# Patient Record
Sex: MALE | Race: BLACK OR AFRICAN AMERICAN
[De-identification: names, ages, dates, MRNs, and addresses within clinical notes are randomized per-mention and may not be internally consistent; named-entity substitution may affect disease eponyms.]

---

## 2020-05-01 ENCOUNTER — HOSPITAL ENCOUNTER (OUTPATIENT)
Dept: HOSPITAL 53 - M LAB | Age: 25
End: 2020-05-01
Attending: NURSE PRACTITIONER

## 2020-05-01 DIAGNOSIS — Z00.00: Primary | ICD-10-CM

## 2020-05-02 LAB
MEV IGG SER IA-ACNC: >300 AU/ML
VZV IGG SER IA-ACNC: <135 INDEX

## 2022-10-22 ENCOUNTER — APPOINTMENT (OUTPATIENT)
Dept: GENERAL RADIOLOGY | Age: 27
DRG: 121 | End: 2022-10-22
Attending: EMERGENCY MEDICINE
Payer: MEDICAID

## 2022-10-22 ENCOUNTER — HOSPITAL ENCOUNTER (INPATIENT)
Age: 27
LOS: 6 days | Discharge: HOME | DRG: 121 | End: 2022-10-28
Attending: EMERGENCY MEDICINE | Admitting: INTERNAL MEDICINE
Payer: MEDICAID

## 2022-10-22 DIAGNOSIS — J93.11 PRIMARY SPONTANEOUS PNEUMOTHORAX: Primary | ICD-10-CM

## 2022-10-22 DIAGNOSIS — Z98.890 S/P THORACOTOMY: ICD-10-CM

## 2022-10-22 PROBLEM — R07.9 CHEST PAIN: Status: ACTIVE | Noted: 2022-10-22

## 2022-10-22 PROBLEM — J93.9 PNEUMOTHORAX: Status: ACTIVE | Noted: 2022-10-22

## 2022-10-22 LAB
ALBUMIN SERPL-MCNC: 4.1 G/DL (ref 3.4–5)
ALBUMIN/GLOB SERPL: 1.3 {RATIO} (ref 0.8–1.7)
ALP SERPL-CCNC: 60 U/L (ref 45–117)
ALT SERPL-CCNC: 25 U/L (ref 16–61)
AMPHET UR QL SCN: NEGATIVE
ANION GAP SERPL CALC-SCNC: 6 MMOL/L (ref 3–18)
AST SERPL-CCNC: 13 U/L (ref 10–38)
ATRIAL RATE: 100 BPM
BARBITURATES UR QL SCN: NEGATIVE
BASOPHILS # BLD: 0 K/UL (ref 0–0.1)
BASOPHILS NFR BLD: 0 % (ref 0–2)
BENZODIAZ UR QL: NEGATIVE
BILIRUB SERPL-MCNC: 0.5 MG/DL (ref 0.2–1)
BUN SERPL-MCNC: 11 MG/DL (ref 7–18)
BUN/CREAT SERPL: 11 (ref 12–20)
CALCIUM SERPL-MCNC: 9.8 MG/DL (ref 8.5–10.1)
CALCULATED P AXIS, ECG09: 81 DEGREES
CALCULATED R AXIS, ECG10: 101 DEGREES
CALCULATED T AXIS, ECG11: 71 DEGREES
CANNABINOIDS UR QL SCN: POSITIVE
CHLORIDE SERPL-SCNC: 108 MMOL/L (ref 100–111)
CO2 SERPL-SCNC: 26 MMOL/L (ref 21–32)
COCAINE UR QL SCN: NEGATIVE
CREAT SERPL-MCNC: 1.01 MG/DL (ref 0.6–1.3)
DIAGNOSIS, 93000: NORMAL
DIFFERENTIAL METHOD BLD: ABNORMAL
EOSINOPHIL # BLD: 0.1 K/UL (ref 0–0.4)
EOSINOPHIL NFR BLD: 2 % (ref 0–5)
ERYTHROCYTE [DISTWIDTH] IN BLOOD BY AUTOMATED COUNT: 17.2 % (ref 11.6–14.5)
GLOBULIN SER CALC-MCNC: 3.2 G/DL (ref 2–4)
GLUCOSE SERPL-MCNC: 98 MG/DL (ref 74–99)
HCT VFR BLD AUTO: 46.7 % (ref 36–48)
HDSCOM,HDSCOM: ABNORMAL
HGB BLD-MCNC: 15 G/DL (ref 13–16)
IMM GRANULOCYTES # BLD AUTO: 0 K/UL (ref 0–0.04)
IMM GRANULOCYTES NFR BLD AUTO: 0 % (ref 0–0.5)
LYMPHOCYTES # BLD: 2.2 K/UL (ref 0.9–3.6)
LYMPHOCYTES NFR BLD: 29 % (ref 21–52)
MCH RBC QN AUTO: 23.3 PG (ref 24–34)
MCHC RBC AUTO-ENTMCNC: 32.1 G/DL (ref 31–37)
MCV RBC AUTO: 72.6 FL (ref 78–100)
METHADONE UR QL: NEGATIVE
MONOCYTES # BLD: 0.6 K/UL (ref 0.05–1.2)
MONOCYTES NFR BLD: 8 % (ref 3–10)
NEUTS SEG # BLD: 4.8 K/UL (ref 1.8–8)
NEUTS SEG NFR BLD: 62 % (ref 40–73)
NRBC # BLD: 0 K/UL (ref 0–0.01)
NRBC BLD-RTO: 0 PER 100 WBC
OPIATES UR QL: NEGATIVE
P-R INTERVAL, ECG05: 120 MS
PCP UR QL: NEGATIVE
PLATELET # BLD AUTO: 300 K/UL (ref 135–420)
PMV BLD AUTO: 10 FL (ref 9.2–11.8)
POTASSIUM SERPL-SCNC: 4.1 MMOL/L (ref 3.5–5.5)
PROT SERPL-MCNC: 7.3 G/DL (ref 6.4–8.2)
Q-T INTERVAL, ECG07: 336 MS
QRS DURATION, ECG06: 82 MS
QTC CALCULATION (BEZET), ECG08: 433 MS
RBC # BLD AUTO: 6.43 M/UL (ref 4.35–5.65)
SODIUM SERPL-SCNC: 140 MMOL/L (ref 136–145)
TROPONIN-HIGH SENSITIVITY: 5 NG/L (ref 0–78)
VENTRICULAR RATE, ECG03: 100 BPM
WBC # BLD AUTO: 7.7 K/UL (ref 4.6–13.2)

## 2022-10-22 PROCEDURE — 74011000250 HC RX REV CODE- 250: Performed by: STUDENT IN AN ORGANIZED HEALTH CARE EDUCATION/TRAINING PROGRAM

## 2022-10-22 PROCEDURE — 96374 THER/PROPH/DIAG INJ IV PUSH: CPT

## 2022-10-22 PROCEDURE — 84484 ASSAY OF TROPONIN QUANT: CPT

## 2022-10-22 PROCEDURE — 80307 DRUG TEST PRSMV CHEM ANLYZR: CPT

## 2022-10-22 PROCEDURE — 93005 ELECTROCARDIOGRAM TRACING: CPT

## 2022-10-22 PROCEDURE — 80053 COMPREHEN METABOLIC PANEL: CPT

## 2022-10-22 PROCEDURE — 74011000250 HC RX REV CODE- 250: Performed by: INTERNAL MEDICINE

## 2022-10-22 PROCEDURE — 96375 TX/PRO/DX INJ NEW DRUG ADDON: CPT

## 2022-10-22 PROCEDURE — 75810000165 HC THORACENTESIS

## 2022-10-22 PROCEDURE — 71045 X-RAY EXAM CHEST 1 VIEW: CPT

## 2022-10-22 PROCEDURE — 85025 COMPLETE CBC W/AUTO DIFF WBC: CPT

## 2022-10-22 PROCEDURE — 74011250637 HC RX REV CODE- 250/637: Performed by: INTERNAL MEDICINE

## 2022-10-22 PROCEDURE — 99223 1ST HOSP IP/OBS HIGH 75: CPT | Performed by: INTERNAL MEDICINE

## 2022-10-22 PROCEDURE — 99252 IP/OBS CONSLTJ NEW/EST SF 35: CPT | Performed by: INTERNAL MEDICINE

## 2022-10-22 PROCEDURE — 74011250636 HC RX REV CODE- 250/636

## 2022-10-22 PROCEDURE — 65660000004 HC RM CVT STEPDOWN

## 2022-10-22 PROCEDURE — 74011250636 HC RX REV CODE- 250/636: Performed by: INTERNAL MEDICINE

## 2022-10-22 PROCEDURE — 74011250636 HC RX REV CODE- 250/636: Performed by: STUDENT IN AN ORGANIZED HEALTH CARE EDUCATION/TRAINING PROGRAM

## 2022-10-22 PROCEDURE — 99285 EMERGENCY DEPT VISIT HI MDM: CPT

## 2022-10-22 PROCEDURE — 2709999900 HC NON-CHARGEABLE SUPPLY

## 2022-10-22 RX ORDER — FAMOTIDINE 20 MG/1
20 TABLET, FILM COATED ORAL EVERY EVENING
Status: DISCONTINUED | OUTPATIENT
Start: 2022-10-22 | End: 2022-10-28 | Stop reason: HOSPADM

## 2022-10-22 RX ORDER — OXYCODONE HYDROCHLORIDE 5 MG/1
5-10 TABLET ORAL
Status: DISCONTINUED | OUTPATIENT
Start: 2022-10-22 | End: 2022-10-26 | Stop reason: SDUPTHER

## 2022-10-22 RX ORDER — POLYETHYLENE GLYCOL 3350 17 G/17G
17 POWDER, FOR SOLUTION ORAL DAILY PRN
Status: DISCONTINUED | OUTPATIENT
Start: 2022-10-22 | End: 2022-10-28 | Stop reason: HOSPADM

## 2022-10-22 RX ORDER — SODIUM CHLORIDE 0.9 % (FLUSH) 0.9 %
5-40 SYRINGE (ML) INJECTION EVERY 8 HOURS
Status: DISCONTINUED | OUTPATIENT
Start: 2022-10-22 | End: 2022-10-28 | Stop reason: HOSPADM

## 2022-10-22 RX ORDER — ONDANSETRON 2 MG/ML
4 INJECTION INTRAMUSCULAR; INTRAVENOUS
Status: DISCONTINUED | OUTPATIENT
Start: 2022-10-22 | End: 2022-10-26 | Stop reason: SDUPTHER

## 2022-10-22 RX ORDER — ACETAMINOPHEN 500 MG
1000 TABLET ORAL EVERY 8 HOURS
Status: DISCONTINUED | OUTPATIENT
Start: 2022-10-22 | End: 2022-10-26

## 2022-10-22 RX ORDER — KETAMINE HCL 50MG/ML(1)
0.25 SYRINGE (ML) INTRAVENOUS ONCE
Status: COMPLETED | OUTPATIENT
Start: 2022-10-22 | End: 2022-10-22

## 2022-10-22 RX ORDER — IPRATROPIUM BROMIDE AND ALBUTEROL SULFATE 2.5; .5 MG/3ML; MG/3ML
3 SOLUTION RESPIRATORY (INHALATION)
Status: DISCONTINUED | OUTPATIENT
Start: 2022-10-22 | End: 2022-10-28 | Stop reason: HOSPADM

## 2022-10-22 RX ORDER — ACETAMINOPHEN 325 MG/1
650 TABLET ORAL
Status: DISCONTINUED | OUTPATIENT
Start: 2022-10-22 | End: 2022-10-26 | Stop reason: SDUPTHER

## 2022-10-22 RX ORDER — MORPHINE SULFATE 4 MG/ML
4 INJECTION INTRAVENOUS
Status: COMPLETED | OUTPATIENT
Start: 2022-10-22 | End: 2022-10-22

## 2022-10-22 RX ORDER — ONDANSETRON 2 MG/ML
4 INJECTION INTRAMUSCULAR; INTRAVENOUS ONCE
Status: COMPLETED | OUTPATIENT
Start: 2022-10-22 | End: 2022-10-22

## 2022-10-22 RX ORDER — HYDROMORPHONE HYDROCHLORIDE 1 MG/ML
1 INJECTION, SOLUTION INTRAMUSCULAR; INTRAVENOUS; SUBCUTANEOUS ONCE
Status: COMPLETED | OUTPATIENT
Start: 2022-10-22 | End: 2022-10-22

## 2022-10-22 RX ORDER — PROMETHAZINE HYDROCHLORIDE 12.5 MG/1
12.5 TABLET ORAL
Status: DISCONTINUED | OUTPATIENT
Start: 2022-10-22 | End: 2022-10-28 | Stop reason: HOSPADM

## 2022-10-22 RX ORDER — MORPHINE SULFATE 4 MG/ML
4 INJECTION INTRAVENOUS
Status: DISCONTINUED | OUTPATIENT
Start: 2022-10-22 | End: 2022-10-26

## 2022-10-22 RX ORDER — SODIUM CHLORIDE 0.9 % (FLUSH) 0.9 %
5-40 SYRINGE (ML) INJECTION AS NEEDED
Status: DISCONTINUED | OUTPATIENT
Start: 2022-10-22 | End: 2022-10-28 | Stop reason: HOSPADM

## 2022-10-22 RX ORDER — LIDOCAINE HYDROCHLORIDE 10 MG/ML
5 INJECTION, SOLUTION EPIDURAL; INFILTRATION; INTRACAUDAL; PERINEURAL ONCE
Status: COMPLETED | OUTPATIENT
Start: 2022-10-22 | End: 2022-10-22

## 2022-10-22 RX ORDER — HYDROMORPHONE HYDROCHLORIDE 1 MG/ML
1 INJECTION, SOLUTION INTRAMUSCULAR; INTRAVENOUS; SUBCUTANEOUS ONCE
Status: DISCONTINUED | OUTPATIENT
Start: 2022-10-22 | End: 2022-10-22

## 2022-10-22 RX ORDER — MORPHINE SULFATE 4 MG/ML
INJECTION, SOLUTION INTRAMUSCULAR; INTRAVENOUS
Status: COMPLETED
Start: 2022-10-22 | End: 2022-10-22

## 2022-10-22 RX ADMIN — MORPHINE SULFATE: 4 INJECTION, SOLUTION INTRAMUSCULAR; INTRAVENOUS at 10:06

## 2022-10-22 RX ADMIN — MORPHINE SULFATE 4 MG: 4 INJECTION INTRAVENOUS at 15:37

## 2022-10-22 RX ADMIN — Medication 19.5 MG: at 09:41

## 2022-10-22 RX ADMIN — MORPHINE SULFATE: 4 INJECTION INTRAVENOUS at 10:06

## 2022-10-22 RX ADMIN — Medication 0.4 MG: at 09:45

## 2022-10-22 RX ADMIN — MORPHINE SULFATE 4 MG: 4 INJECTION INTRAVENOUS at 20:40

## 2022-10-22 RX ADMIN — ACETAMINOPHEN 1000 MG: 500 TABLET ORAL at 22:10

## 2022-10-22 RX ADMIN — FAMOTIDINE 20 MG: 20 TABLET ORAL at 17:34

## 2022-10-22 RX ADMIN — Medication 0.4 MG: at 09:47

## 2022-10-22 RX ADMIN — SODIUM CHLORIDE, PRESERVATIVE FREE 10 ML: 5 INJECTION INTRAVENOUS at 15:02

## 2022-10-22 RX ADMIN — LIDOCAINE HYDROCHLORIDE 5 ML: 10 INJECTION, SOLUTION EPIDURAL; INFILTRATION; INTRACAUDAL; PERINEURAL at 09:44

## 2022-10-22 RX ADMIN — SODIUM CHLORIDE, PRESERVATIVE FREE 10 ML: 5 INJECTION INTRAVENOUS at 20:40

## 2022-10-22 RX ADMIN — SODIUM CHLORIDE, PRESERVATIVE FREE 10 ML: 5 INJECTION INTRAVENOUS at 22:10

## 2022-10-22 RX ADMIN — OXYCODONE HYDROCHLORIDE 5 MG: 5 TABLET ORAL at 13:00

## 2022-10-22 RX ADMIN — Medication 0.2 MG: at 09:53

## 2022-10-22 RX ADMIN — ONDANSETRON 4 MG: 2 INJECTION INTRAMUSCULAR; INTRAVENOUS at 09:23

## 2022-10-22 RX ADMIN — HYDROMORPHONE HYDROCHLORIDE 1 MG: 1 INJECTION, SOLUTION INTRAMUSCULAR; INTRAVENOUS; SUBCUTANEOUS at 10:25

## 2022-10-22 RX ADMIN — ACETAMINOPHEN 1000 MG: 500 TABLET ORAL at 15:02

## 2022-10-22 NOTE — H&P
History & Physical    Patient: Rick Montero MRN: [id number]  [facility]: [id number]    YOB: 1995  Age: 33 y.o. Sex: male      DOA: 10/22/2022  CC: left-sided chest pain    PCP: None       HPI:     Rick Montero is a 33 y.o. male with medical co-morbidities including active tobacco smoking, vaping, marijuana usage, presented from home with left-sided chest pain and hard to breath. He experienced persistent left-sided chest pain on waking up this AM. He had shortness of breath. No cough, no fever. He was doing well after work since last night. No leg swelling, no recent long distance travel. In the ER, he was found to have left tension pneumothorax. Lab was normal. Negative troponin. He received emergent chest tube placement. On repeat CXR, with improved pneumothorax. Pulmonary was consulted from the ER for further follow up. He has no family history of pneumothorax. No history of trauma to chest wall. He smokes tobacco and marijuana. No he has chest pain at the insertion site. Review of Systems  GENERAL: No fever, No chill, No malaise   HEENT: No change in vision, no ear ache, no sore throat or sinus congestion. NECK: No pain or stiffness. PULMONARY: + shortness of breath, no cough or wheeze. Cardiovascular: no pnd / orthopnea, + left-sided Chest Pain  GASTROINTESTINAL: No abd pain, No nausea/vomiting, No diarrhea, No melena or bright red blood per rectum. GENITOURINARY: No urinary frequency, No urgency or pain with urination. MUSCULOSKELETAL: No joint or muscle pain, no back pain, no recent trauma. DERMATOLOGIC: No rash, no itching, no lesions. ENDOCRINE: No polyuria, polydipsia, No recent change in weight. HEMATOLOGICAL: No easy bruising or bleeding. NEUROLOGIC: No headache, No seizures, No generalized weakness         History reviewed. No pertinent past medical history. He denied    History reviewed. No pertinent surgical history.   He denied     Family History Problem Relation Age of Onset    Cancer Father        Social History     Socioeconomic History    Marital status: SINGLE   Tobacco Use    Smoking status: Every Day     Types: Cigarettes    Smokeless tobacco: Never   Vaping Use    Vaping Use: Every day   Substance and Sexual Activity    Alcohol use: Never    Drug use: Yes     Types: Marijuana       Prior to Admission medications    Not on File   No medication currently    No Known Allergies           Physical Exam:      Visit Vitals  BP (!) 149/69   Pulse 86   Temp 97.6 °F (36.4 °C)   Resp 21   Ht 5' 11\" (1.803 m)   Wt 77.1 kg (170 lb)   SpO2 100%   BMI 23.71 kg/m²       Physical Exam:  Tele: sinus   General:  Cooperative, Not in acute distress, speaks in short sentence while in bed  HEENT: PERRL, EOMI, supple neck, no JVD, dry oral mucosa  Cardiovascular: S1S2 regular, no rub/gallop   Pulmonary: air entry bilaterally, no wheezing, no crackle  Chest tube on left lateral chest, no crepitus in the surrounding chest wall  GI:  Soft, non tender, non distended, +bs, no guarding   Extremities:  No pedal edema, +distal pulses appreciated   Neuro: AOx3, moving all extremities, no gross deficit. Lab/Data Review:  Labs: Results:       Chemistry Recent Labs     10/22/22  0859   GLU 98      K 4.1      CO2 26   BUN 11   CREA 1.01   CA 9.8   AGAP 6   BUCR 11*   AP 60   TP 7.3   ALB 4.1   GLOB 3.2   AGRAT 1.3      CBC w/Diff Recent Labs     10/22/22  0859   WBC 7.7   RBC 6.43*   HGB 15.0   HCT 46.7      GRANS 62   LYMPH 29   EOS 2      Coagulation No results for input(s): PTP, INR, APTT, INREXT in the last 72 hours. Iron/Ferritin No results for input(s): IRON in the last 72 hours. No lab exists for component: TIBCCALC   BNP No results for input(s): BNPP in the last 72 hours. Cardiac Enzymes No results for input(s): CPK, CKND1, RAMBO in the last 72 hours.     No lab exists for component: CKRMB, TROIP   Liver Enzymes Recent Labs     10/22/22  0859   TP 7. 3   ALB 4.1   AP 60      Thyroid Studies No results found for: T4, T3U, TSH, TSHEXT       All Micro Results       None            Imaging Reviewed:  XR Results (most recent):  Results from Hospital Encounter encounter on 10/22/22    XR CHEST PORT    Narrative  CHEST PORTABLE 1014 hours    COMPARISON: 0856 hours. INDICATIONS: Pneumothorax. FINDINGS:    Portable single view chest demonstrates:    Lungs: Clear. Cardiac Silhouette And Mediastinal Contours: Normal.    Pleural Spaces: A chest tube is been placed. Is no significant residual volume  of pneumothorax. .    Bones And Soft Tissues: Unremarkable for age. Impression  A chest tube is been placed with aspiration of the pneumothorax. .      CHEST PORTABLE 0856 hours  COMPARISON: None. INDICATIONS: Chest pain. FINDINGS:   Portable single view chest demonstrates:  Lungs: The left lung is completely atelectatic. Cardiac Silhouette And Mediastinal Contours: There is mild dextroversion of the  cardiac and mediastinal structures. Pleural Spaces: A left tension pneumothorax is present. Bones And Soft Tissues: Unremarkable for age. IMPRESSION  Left tension pneumothorax. An immediate reading was provided to Dr. Bharath Moctezuma  in the ED at 921 hours    Assessment:   Active Problems:    Spontaneous Tension Pneumothorax (10/22/2022)    Left-sided Chest pain due to pneumothorax     Tobacco smoking     Marijuana smoking     Hypertension due to pain at the time     Plan:     Admitted to stepdown for close monitoring. Add duoneb prn.    Continue chest tube to wall suction for now   Daily chest xray   Pulmonary consult follow up  Educated on risks associated with tobacco smoking, advised on tobacco smoking cessation   Can have tylenol 1000mg Q8hrs, add prn morphine or oxycodone   Pepcid   ICS encourage   Chest urine tox screen         Risk of deterioration:  []Low    [x]Moderate  []High     Prophylaxis:  []Lovenox  []Coumadin  []Hep SQ  [x]SCDs  [x]H2B/PPI Disposition:  [x]Home w/ Family   []HH PT,OT,RN   []SNF/LTC   []SAH/Rehab     Discussed Code Status:         [x]Full Code      []DNR         ___________________________________________________     Care Plan discussed with:    [x]Patient   []Family    []ED Care Manager  [x]ED Doc   [x]Specialist :  Total Time Coordinating Admission:  >55  minutes    []Total Critical Care Time:       Lori Garay MD  10/22/2022, 11:50 AM

## 2022-10-22 NOTE — ROUTINE PROCESS
TRANSFER - OUT REPORT:    Verbal report given to Kiersten(name) on Tegan Bliss  being transferred to 2306(unit) for routine progression of care       Report consisted of patients Situation, Background, Assessment and   Recommendations(SBAR). Information from the following report(s) SBAR was reviewed with the receiving nurse. Lines:   Peripheral IV 10/22/22 Right Antecubital (Active)   Site Assessment Clean, dry, & intact 10/22/22 0859   Phlebitis Assessment 0 10/22/22 0859   Infiltration Assessment 0 10/22/22 0859   Dressing Status Clean, dry, & intact 10/22/22 0859   Hub Color/Line Status Green 10/22/22 0859        Opportunity for questions and clarification was provided.       Patient transported with:   Registered Nurse, monitor, transport

## 2022-10-22 NOTE — CONSULTS
[facility]  Pulmonary, Critical Care, and Sleep Medicine    Name: Elina Callaway MRN: [id number]   : 1995 Hospital: [address] [place] [address] Dr   Date: 10/22/2022        Pulmonary Initial In-Patient Consult                                              Consult requesting physician: Dr. Carlyon Schwab  Reason for Consult: Spontaneous pneumothorax    IMPRESSION:   Spontaneous pneumothorax on left side now resolved s/p chest tube placement. Tobacco abuse  Microcytic anemia: thalassemia versus iron deficiency anemia        RECOMMENDATIONS:   Recommend keeping chest tube in place on suction. Titrate O2 to maintain sats > 92%. Recommend non-contrast chest CT to evaluate lung parenchyma. Recommend work up of microcytic anemia - consider iron studies, hemoglobin electrophoresis  Optimize pain management  Nutrition: per primary team  Replace electrolytes  HOB >=30 degree, aggressive pulmonary toileting, incentive spirometry, PT/OT eval and treat  GI Prophylaxis: per primary team  not indicated  DVT Prophylaxis: SCDs recommended   Smoking cessation counseling done by me and spent >11 minutes on it. Further recommendations will be based on the patient's response to recommended treatment and results of the investigation ordered. Subjective/History:     Elina Callaway is a 33 y.o. male with PMHx significant for tobacco use who had sudden onset this morning of left sided chest pain and dyspnea when we awoke from sleep. No previous ill contacts, other symptoms or trauma. Upon presentation to ED, left sided pneumothorax diagnosed and chest tube placed on left side without complication and noted to have full lung re-expansion. Currently has moderate left sided pain and has no dyspnea on 4 L/min of supplemental O2. Review of Systems:  A comprehensive review of systems was negative except for that written in the HPI. No Known Allergies     History reviewed. No pertinent past medical history. History reviewed. No pertinent surgical history. Family History   Problem Relation Age of Onset    Cancer Father         Social History     Tobacco Use    Smoking status: Every Day     Types: Cigarettes    Smokeless tobacco: Never   Substance Use Topics    Alcohol use: Never      Prior to Admission medications    Not on File       Current Facility-Administered Medications   Medication Dose Route Frequency    sodium chloride (NS) flush 5-40 mL  5-40 mL IntraVENous Q8H    sodium chloride (NS) flush 5-40 mL  5-40 mL IntraVENous PRN    acetaminophen (TYLENOL) tablet 650 mg  650 mg Oral Q6H PRN    Or    acetaminophen (TYLENOL) suppository 650 mg  650 mg Rectal Q6H PRN    polyethylene glycol (MIRALAX) packet 17 g  17 g Oral DAILY PRN    promethazine (PHENERGAN) tablet 12.5 mg  12.5 mg Oral Q6H PRN    Or    ondansetron (ZOFRAN) injection 4 mg  4 mg IntraVENous Q6H PRN    famotidine (PEPCID) tablet 20 mg  20 mg Oral QPM    albuterol-ipratropium (DUO-NEB) 2.5 MG-0.5 MG/3 ML  3 mL Nebulization Q6H PRN    acetaminophen (TYLENOL) tablet 1,000 mg  1,000 mg Oral Q8H    oxyCODONE IR (ROXICODONE) tablet 5-10 mg  5-10 mg Oral Q4H PRN    morphine injection 4 mg  4 mg IntraVENous Q4H PRN     No current outpatient medications on file. Objective:   Vital Signs:    Visit Vitals  BP (!) 149/69   Pulse (!) 58   Temp 97.6 °F (36.4 °C)   Resp 21   Ht 5' 11\" (1.803 m)   Wt 77.1 kg (170 lb)   SpO2 100%   BMI 23.71 kg/m²       O2 Device: Nasal cannula   O2 Flow Rate (L/min): 4 l/min   Temp (24hrs), Av.6 °F (36.4 °C), Min:97.6 °F (36.4 °C), Max:97.6 °F (36.4 °C)       Intake/Output:   Last shift:      No intake/output data recorded. Last 3 shifts: No intake/output data recorded. No intake or output data in the 24 hours ending 10/22/22 1247    Physical Exam:     General:  Alert, Awake, NAD, cooperative, no distress, appears stated age. Head:   Normocephalic, without obvious abnormality, atraumatic.   Eye: Conjunctivae/corneas clear. PERRLA, no scleral icterus, no pallor, no cyanosis  Nose:   Nares normal. Septum midline. Mucosa normal without erythema/exudate. No drainage/discharge. No sinus tenderness. Throat:  Lips, mucosa, and tongue normal. Teeth and gums normal. No tonsillar enlargement, no erythema, no exudates, no oral thrush  Neck:   Supple, symmetric, thyroid: no enlargement/tenderness/nodule, no JVD, no carotid bruit, no lymphadenopathy. Trachea midline  Back & spine: Symmetric, no curvature. Chest wall: Chest tube in place left thorax  Lung:   Adequate air entry bilateral equal, no rales, no rhonchi, no wheezing. No dullness on percussion. Heart:   Regular rate & rhythm. S1 S2 present, no murmur, no gallop, no click, no rub  Abdomen:  Soft, NT, ND, +BS, no masses, no organomegaly  Extremities:  No pedal edema, no cyanosis, no clubbing  Pulses: 2+ and symmetric in DP  Lymphatic:  No cervical, supraclavicular and axillary palpable lymphadenopathy. Musculoskeletal: No joint swelling or tenderness. Neurologic:  Grossly non focal.        Data:         Chemistry Recent Labs     10/22/22  0859   GLU 98      K 4.1      CO2 26   BUN 11   CREA 1.01   CA 9.8   AGAP 6   BUCR 11*   AP 60   TP 7.3   ALB 4.1   GLOB 3.2   AGRAT 1.3        Lactic Acid No results found for: LAC  No results for input(s): LAC in the last 72 hours. Liver Enzymes Protein, total   Date Value Ref Range Status   10/22/2022 7.3 6.4 - 8.2 g/dL Final     Albumin   Date Value Ref Range Status   10/22/2022 4.1 3.4 - 5.0 g/dL Final     Globulin   Date Value Ref Range Status   10/22/2022 3.2 2.0 - 4.0 g/dL Final     A-G Ratio   Date Value Ref Range Status   10/22/2022 1.3 0.8 - 1.7   Final     Alk.  phosphatase   Date Value Ref Range Status   10/22/2022 60 45 - 117 U/L Final     Recent Labs     10/22/22  0859   TP 7.3   ALB 4.1   GLOB 3.2   AGRAT 1.3   AP 60        CBC w/Diff Recent Labs     10/22/22  0859   WBC 7.7   RBC 6.43* HGB 15.0   HCT 46.7      GRANS 62   LYMPH 29   EOS 2        Cardiac Enzymes No results found for: CPK, CK, CKMMB, CKMB, RCK3, CKMBT, CKNDX, CKND1, RAMBO, TROPT, TROIQ, BINTA, TROPT, TNIPOC, BNP, BNPP     BNP No results found for: BNP, BNPP, XBNPT     Coagulation No results for input(s): PTP, INR, APTT, INREXT in the last 72 hours. Thyroid  No results found for: T4, T3U, TSH, TSHEXT       Lipid Panel No results found for: CHOL, CHOLPOCT, CHOLX, CHLST, CHOLV, 052730, HDL, HDLP, LDL, LDLC, DLDLP, 592873, VLDLC, VLDL, TGLX, TRIGL, TRIGP, TGLPOCT, CHHD, CHHDX     ABG No results for input(s): PHI, PHI, POC2, PCO2I, PO2, PO2I, HCO3, HCO3I, FIO2, FIO2I in the last 72 hours. Urinalysis No results found for: COLOR, APPRN, SPGRU, REFSG, SHAHNAZ, PROTU, GLUCU, KETU, BILU, UROU, JAMES, LEUKU, GLUKE, EPSU, BACTU, WBCU, RBCU, CASTS, UCRY     Micro  No results for input(s): SDES, CULT in the last 72 hours. No results for input(s): CULT in the last 72 hours. XR (Most Recent). CXR reviewed by me and compared with previous CXR Results from Hospital Encounter encounter on 10/22/22    XR CHEST PORT    Narrative  CHEST PORTABLE 1014 hours    COMPARISON: 0856 hours. INDICATIONS: Pneumothorax. FINDINGS:    Portable single view chest demonstrates:    Lungs: Clear. Cardiac Silhouette And Mediastinal Contours: Normal.    Pleural Spaces: A chest tube is been placed. Is no significant residual volume  of pneumothorax. .    Bones And Soft Tissues: Unremarkable for age. Impression  A chest tube is been placed with aspiration of the pneumothorax. .       CT (Most Recent) No results found for this or any previous visit. EKG No results found for this or any previous visit. ECHO No results found for this or any previous visit. PFT No flowsheet data found.      Other ASA reactivity:   Pre-albumin:   Ionized Calcium:   NH4:   T3, FT4:  Cortisol:  Urine Osm:  Urine Lytes:   HbA1c:      Recent Results (from the past 24 hour(s))   EKG, 12 LEAD, INITIAL    Collection Time: 10/22/22  8:50 AM   Result Value Ref Range    Ventricular Rate 100 BPM    Atrial Rate 100 BPM    P-R Interval 120 ms    QRS Duration 82 ms    Q-T Interval 336 ms    QTC Calculation (Bezet) 433 ms    Calculated P Axis 81 degrees    Calculated R Axis 101 degrees    Calculated T Axis 71 degrees    Diagnosis       Normal sinus rhythm  Right atrial enlargement  Anterolateral infarct , age undetermined  Abnormal ECG  No previous ECGs available  Confirmed by Garrett Morin MD, Tiana Leon ([id number]) on 10/22/2022 12:00:40 PM     CBC WITH AUTOMATED DIFF    Collection Time: 10/22/22  8:59 AM   Result Value Ref Range    WBC 7.7 4.6 - 13.2 K/uL    RBC 6.43 (H) 4.35 - 5.65 M/uL    HGB 15.0 13.0 - 16.0 g/dL    HCT 46.7 36.0 - 48.0 %    MCV 72.6 (L) 78.0 - 100.0 FL    MCH 23.3 (L) 24.0 - 34.0 PG    MCHC 32.1 31.0 - 37.0 g/dL    RDW 17.2 (H) 11.6 - 14.5 %    PLATELET 168 630 - 288 K/uL    MPV 10.0 9.2 - 11.8 FL    NRBC 0.0 0  WBC    ABSOLUTE NRBC 0.00 0.00 - 0.01 K/uL    NEUTROPHILS 62 40 - 73 %    LYMPHOCYTES 29 21 - 52 %    MONOCYTES 8 3 - 10 %    EOSINOPHILS 2 0 - 5 %    BASOPHILS 0 0 - 2 %    IMMATURE GRANULOCYTES 0 0.0 - 0.5 %    ABS. NEUTROPHILS 4.8 1.8 - 8.0 K/UL    ABS. LYMPHOCYTES 2.2 0.9 - 3.6 K/UL    ABS. MONOCYTES 0.6 0.05 - 1.2 K/UL    ABS. EOSINOPHILS 0.1 0.0 - 0.4 K/UL    ABS. BASOPHILS 0.0 0.0 - 0.1 K/UL    ABS. IMM.  GRANS. 0.0 0.00 - 0.04 K/UL    DF AUTOMATED     METABOLIC PANEL, COMPREHENSIVE    Collection Time: 10/22/22  8:59 AM   Result Value Ref Range    Sodium 140 136 - 145 mmol/L    Potassium 4.1 3.5 - 5.5 mmol/L    Chloride 108 100 - 111 mmol/L    CO2 26 21 - 32 mmol/L    Anion gap 6 3.0 - 18 mmol/L    Glucose 98 74 - 99 mg/dL    BUN 11 7.0 - 18 MG/DL    Creatinine 1.01 0.6 - 1.3 MG/DL    BUN/Creatinine ratio 11 (L) 12 - 20      eGFR >60 >60 ml/min/1.73m2    Calcium 9.8 8.5 - 10.1 MG/DL    Bilirubin, total 0.5 0.2 - 1.0 MG/DL    ALT (SGPT) 25 16 - 61 U/L    AST (SGOT) 13 10 - 38 U/L    Alk. phosphatase 60 45 - 117 U/L    Protein, total 7.3 6.4 - 8.2 g/dL    Albumin 4.1 3.4 - 5.0 g/dL    Globulin 3.2 2.0 - 4.0 g/dL    A-G Ratio 1.3 0.8 - 1.7     TROPONIN-HIGH SENSITIVITY    Collection Time: 10/22/22  8:59 AM   Result Value Ref Range    Troponin-High Sensitivity 5 0 - 78 ng/L         Telemetry:normal sinus rhythm    The patient is: [x] acutely ill Risk of deterioration: [x] moderate    [] critically ill  [] high     [x]See my orders for details    My assessment, plan of care, findings, medications, side effects etc were discussed with:  [] Nurse [] PT/OT    [] Respiratory therapy [x] Dr. Doug East   [] Family: answered all questions to satisfaction [x] Patient: answered all questions to satisfaction   [] Pharmacist []      [x] Total critical care time exclusive of procedures 60 minutes with complex decision making, coordination of care and counseling patient performed and > 50% time spent in face to face evaluation.       Allyn Nixon MD  10/22/2022

## 2022-10-22 NOTE — ROUTINE PROCESS
1903:Bedside and Verbal shift change report given to Wei S Kelton See (oncoming nurse) by Jazz Downing (offgoing nurse). Report included the following information SBAR, Kardex, Intake/Output, MAR, Recent Results, and Cardiac Rhythm SR w/ PVC's . Quietly resting in bed. HOB elevated. No SOB on RA. Left chest tube to water suction. With 5 ml sanguineous drainage. Dressing CDI. No crepitus. Call light within reach. 2030: Assessment done. 2040: Medicated for pain per patient request.     6475: Due meds given. HS snack provided. 0022: No change from previous assessment. 0200: Comfortably sleeping. 4116: No change from previous assessment. Due med given. 0544: Slept on & off thru night. Needs attended. Medicated for pain per patient request.     0875: Bedside and Verbal shift change report given to April RN & Cassandra Bush RN (oncoming nurse) by Gagan Bejarano RN (offgoing nurse). Report included the following information SBAR, Kardex, Intake/Output, MAR, Recent Results, and Cardiac Rhythm SB/SR w/ multiform PVC's .

## 2022-10-22 NOTE — ED PROVIDER NOTES
HPI     79-year-old male presents the ED with sudden onset chest pain that occurred this morning when he awoke from sleep. He is also endorsing shortness of breath. He states it is on his left side and radiating down \"to his left kidney. \"  Patient has never had pain like this before, states no medical conditions never had a pneumothorax, no medicines that are daily and no allergies. He does smoke tobacco and marijuana. Denies vision changes, endorses nausea    History reviewed. No pertinent past medical history. History reviewed. No pertinent surgical history. Family History:   Problem Relation Age of Onset    Cancer Father        Social History     Socioeconomic History    Marital status: SINGLE     Spouse name: Not on file    Number of children: Not on file    Years of education: Not on file    Highest education level: Not on file   Occupational History    Not on file   Tobacco Use    Smoking status: Every Day     Types: Cigarettes    Smokeless tobacco: Never   Vaping Use    Vaping Use: Every day   Substance and Sexual Activity    Alcohol use: Never    Drug use: Yes     Types: Marijuana    Sexual activity: Not on file   Other Topics Concern    Not on file   Social History Narrative    Not on file     Social Determinants of Health     Financial Resource Strain: Not on file   Food Insecurity: Not on file   Transportation Needs: Not on file   Physical Activity: Not on file   Stress: Not on file   Social Connections: Not on file   Intimate Partner Violence: Not on file   Housing Stability: Not on file         ALLERGIES: Patient has no known allergies. Review of Systems   HENT:  Negative for congestion and rhinorrhea. Respiratory:  Positive for shortness of breath. Cardiovascular:  Positive for chest pain. Gastrointestinal:  Positive for nausea. Negative for abdominal pain, diarrhea and vomiting. Genitourinary:  Negative for urgency. Musculoskeletal:  Positive for back pain.    Neurological: Negative for dizziness, seizures, light-headedness, numbness and headaches. Vitals:    10/22/22 1015 10/22/22 1100 10/22/22 1200 10/22/22 1300   BP: (!) 149/69   (!) 145/105   Pulse: 80 86 (!) 58 71   Resp: 21   17   Temp:       SpO2: 100% 100% 100% 99%   Weight:       Height:                Physical Exam  Vitals and nursing note reviewed. Constitutional:       General: He is in acute distress. Appearance: He is not ill-appearing or toxic-appearing. HENT:      Head: Normocephalic and atraumatic. Eyes:      Extraocular Movements: Extraocular movements intact. Pupils: Pupils are equal, round, and reactive to light. Cardiovascular:      Rate and Rhythm: Regular rhythm. Tachycardia present. Heart sounds: Normal heart sounds. Pulmonary:      Effort: Respiratory distress present. Breath sounds: Examination of the right-upper field reveals decreased breath sounds. Examination of the right-middle field reveals decreased breath sounds. Examination of the right-lower field reveals decreased breath sounds. Decreased breath sounds present. No wheezing, rhonchi or rales. Chest:      Chest wall: No mass or tenderness. Abdominal:      Palpations: Abdomen is soft. Tenderness: There is no abdominal tenderness. Musculoskeletal:         General: Normal range of motion. Cervical back: Normal range of motion. Right lower leg: No tenderness. Left lower leg: No tenderness. Skin:     General: Skin is warm and dry. Capillary Refill: Capillary refill takes less than 2 seconds. Neurological:      General: No focal deficit present. Mental Status: He is alert and oriented to person, place, and time. Cranial Nerves: No cranial nerve deficit. Motor: No weakness. MDM    81-year-old male presents the ED with sudden onset chest pain that occurred this morning when he awoke from sleep. He is also endorsing shortness of breath.   He states it is on his left side and radiating down \"to his left kidney. \"  Patient has never had pain like this before, states no medical conditions never had a pneumothorax, no medicines that are daily and no allergies. He does smoke tobacco and marijuana. Denies vision changes, endorses nausea    It appears patient suffered a spontaneous pneumothorax as he denies any trauma, any GSWs, any falls. Patient received 4 mg of Zofran, IV fluids prechest tube. During chest tube, please refer to note he received 50 mg of ketamine, 4 mg of morphine and after the chest tube he was given 1 mg of Dilaudid    Post tube chest x-ray showed lung markings present bilaterally with reexpansion of the left previously collapsed lung. The tube went up and posterior position and was secured at 12. Labs within normal limits  UDS positive for cannabis which is expected as patient admits that this    Vital signs remained stable entire time  Diagnosis: spontaneous tension pneumothorax  Patient accepted for admission to stepdown with telemetry  Pulmonary consult placed with Dr. Saritha Quijano    Patient informed of plan, he agrees with plan and is comfortable at this time.     Chest Tube Insertion    Date/Time: 10/22/2022 10:32 AM  Performed by: Rai Rincon DO  Authorized by: Rai Rincon DO     Consent:     Consent obtained:  Verbal    Consent given by:  Patient    Risks, benefits, and alternatives were discussed: yes      Risks discussed:  Bleeding, incomplete drainage, nerve damage, infection, pain and damage to surrounding structures    Alternatives discussed:  No treatment  Universal protocol:     Procedure explained and questions answered to patient or proxy's satisfaction: yes      Imaging studies available: yes      Site/side marked: yes      Immediately prior to procedure, a time out was called: yes      Patient identity confirmed:  Verbally with patient and arm band  Pre-procedure details:     Skin preparation:  Chlorhexidine    Preparation: Patient was prepped and draped in the usual sterile fashion    Sedation:     Sedation type:   Moderate sedation (50mg Ketamine, monitored, tolerated well)  Anesthesia:     Anesthesia method:  Local infiltration    Local anesthetic:  Lidocaine 1% w/o epi  Procedure details:     Placement location:  L lateral    Scalpel size:  11    Tube size (Fr):  24    Dissection instrument:  Suzan clamp and finger    Ultrasound guidance: no      Tube connected to:  Suction    Drainage characteristics:  Air only    Suture material:  2-0 silk    Dressing:  Xeroform gauze and 4x4 sterile gauze  Post-procedure details:     Post-insertion x-ray findings: tube in good position      Procedure completion:  Tolerated well, no immediate complications                 Recent Results (from the past 12 hour(s))   EKG, 12 LEAD, INITIAL    Collection Time: 10/22/22  8:50 AM   Result Value Ref Range    Ventricular Rate 100 BPM    Atrial Rate 100 BPM    P-R Interval 120 ms    QRS Duration 82 ms    Q-T Interval 336 ms    QTC Calculation (Bezet) 433 ms    Calculated P Axis 81 degrees    Calculated R Axis 101 degrees    Calculated T Axis 71 degrees    Diagnosis       Normal sinus rhythm  Right atrial enlargement  Anterolateral infarct , age undetermined  Abnormal ECG  No previous ECGs available  Confirmed by Janie Sanz MD, Sisto Gave ([id number]) on 10/22/2022 12:00:40 PM     CBC WITH AUTOMATED DIFF    Collection Time: 10/22/22  8:59 AM   Result Value Ref Range    WBC 7.7 4.6 - 13.2 K/uL    RBC 6.43 (H) 4.35 - 5.65 M/uL    HGB 15.0 13.0 - 16.0 g/dL    HCT 46.7 36.0 - 48.0 %    MCV 72.6 (L) 78.0 - 100.0 FL    MCH 23.3 (L) 24.0 - 34.0 PG    MCHC 32.1 31.0 - 37.0 g/dL    RDW 17.2 (H) 11.6 - 14.5 %    PLATELET 957 155 - 860 K/uL    MPV 10.0 9.2 - 11.8 FL    NRBC 0.0 0  WBC    ABSOLUTE NRBC 0.00 0.00 - 0.01 K/uL    NEUTROPHILS 62 40 - 73 %    LYMPHOCYTES 29 21 - 52 %    MONOCYTES 8 3 - 10 %    EOSINOPHILS 2 0 - 5 %    BASOPHILS 0 0 - 2 %    IMMATURE GRANULOCYTES 0 0.0 - 0.5 % ABS. NEUTROPHILS 4.8 1.8 - 8.0 K/UL    ABS. LYMPHOCYTES 2.2 0.9 - 3.6 K/UL    ABS. MONOCYTES 0.6 0.05 - 1.2 K/UL    ABS. EOSINOPHILS 0.1 0.0 - 0.4 K/UL    ABS. BASOPHILS 0.0 0.0 - 0.1 K/UL    ABS. IMM. GRANS. 0.0 0.00 - 0.04 K/UL    DF AUTOMATED     METABOLIC PANEL, COMPREHENSIVE    Collection Time: 10/22/22  8:59 AM   Result Value Ref Range    Sodium 140 136 - 145 mmol/L    Potassium 4.1 3.5 - 5.5 mmol/L    Chloride 108 100 - 111 mmol/L    CO2 26 21 - 32 mmol/L    Anion gap 6 3.0 - 18 mmol/L    Glucose 98 74 - 99 mg/dL    BUN 11 7.0 - 18 MG/DL    Creatinine 1.01 0.6 - 1.3 MG/DL    BUN/Creatinine ratio 11 (L) 12 - 20      eGFR >60 >60 ml/min/1.73m2    Calcium 9.8 8.5 - 10.1 MG/DL    Bilirubin, total 0.5 0.2 - 1.0 MG/DL    ALT (SGPT) 25 16 - 61 U/L    AST (SGOT) 13 10 - 38 U/L    Alk.  phosphatase 60 45 - 117 U/L    Protein, total 7.3 6.4 - 8.2 g/dL    Albumin 4.1 3.4 - 5.0 g/dL    Globulin 3.2 2.0 - 4.0 g/dL    A-G Ratio 1.3 0.8 - 1.7     TROPONIN-HIGH SENSITIVITY    Collection Time: 10/22/22  8:59 AM   Result Value Ref Range    Troponin-High Sensitivity 5 0 - 78 ng/L   DRUG SCREEN, URINE    Collection Time: 10/22/22  1:10 PM   Result Value Ref Range    BENZODIAZEPINES Negative NEG      BARBITURATES Negative NEG      THC (TH-CANNABINOL) Positive (A) NEG      OPIATES Negative NEG      PCP(PHENCYCLIDINE) Negative NEG      COCAINE Negative NEG      AMPHETAMINES Negative NEG      METHADONE Negative NEG      HDSCOM (NOTE)

## 2022-10-22 NOTE — ED TRIAGE NOTES
Client reports awaking this am to l. Upper chest pain radiating to l. Shoulder with SOB. Pain 9/10. Client denies any fall or truama, no medical history. AXOX4.

## 2022-10-22 NOTE — ED NOTES
Patient present to ED after awaking with onset SOB, states went to bed feeling okay, states left chest, shoulder pain.  Patient appeared in pain on arrival to ED, notable distress,VSS, xray confirmed pneumothorax, left chest.

## 2022-10-22 NOTE — ROUTINE PROCESS
Bedside shift change report given to Rosendo Romberg (oncoming nurse) by Shauna Cole RN (offgoing nurse). Report included the following information SBAR, Kardex, Intake/Output, MAR, and Recent Results.

## 2022-10-22 NOTE — PROGRESS NOTES
Received alert and oriented male pt from ER, pt with left sided chest tube small amount of serosanguinous drainage noted on dressing. No output noted in chest tube.

## 2022-10-23 ENCOUNTER — APPOINTMENT (OUTPATIENT)
Dept: GENERAL RADIOLOGY | Age: 27
DRG: 121 | End: 2022-10-23
Attending: INTERNAL MEDICINE
Payer: MEDICAID

## 2022-10-23 ENCOUNTER — APPOINTMENT (OUTPATIENT)
Dept: CT IMAGING | Age: 27
DRG: 121 | End: 2022-10-23
Attending: INTERNAL MEDICINE
Payer: MEDICAID

## 2022-10-23 LAB
ANION GAP SERPL CALC-SCNC: 2 MMOL/L (ref 3–18)
BUN SERPL-MCNC: 6 MG/DL (ref 7–18)
BUN/CREAT SERPL: 6 (ref 12–20)
CALCIUM SERPL-MCNC: 9.3 MG/DL (ref 8.5–10.1)
CHLORIDE SERPL-SCNC: 105 MMOL/L (ref 100–111)
CO2 SERPL-SCNC: 30 MMOL/L (ref 21–32)
CREAT SERPL-MCNC: 1.06 MG/DL (ref 0.6–1.3)
ERYTHROCYTE [DISTWIDTH] IN BLOOD BY AUTOMATED COUNT: 16.7 % (ref 11.6–14.5)
GLUCOSE SERPL-MCNC: 109 MG/DL (ref 74–99)
HCT VFR BLD AUTO: 45.3 % (ref 36–48)
HGB BLD-MCNC: 14.5 G/DL (ref 13–16)
MAGNESIUM SERPL-MCNC: 1.8 MG/DL (ref 1.6–2.6)
MCH RBC QN AUTO: 23.3 PG (ref 24–34)
MCHC RBC AUTO-ENTMCNC: 32 G/DL (ref 31–37)
MCV RBC AUTO: 72.9 FL (ref 78–100)
NRBC # BLD: 0 K/UL (ref 0–0.01)
NRBC BLD-RTO: 0 PER 100 WBC
PLATELET # BLD AUTO: 256 K/UL (ref 135–420)
PMV BLD AUTO: 10.9 FL (ref 9.2–11.8)
POTASSIUM SERPL-SCNC: 4.2 MMOL/L (ref 3.5–5.5)
RBC # BLD AUTO: 6.21 M/UL (ref 4.35–5.65)
SODIUM SERPL-SCNC: 137 MMOL/L (ref 136–145)
WBC # BLD AUTO: 10.1 K/UL (ref 4.6–13.2)

## 2022-10-23 PROCEDURE — 36415 COLL VENOUS BLD VENIPUNCTURE: CPT

## 2022-10-23 PROCEDURE — 99232 SBSQ HOSP IP/OBS MODERATE 35: CPT | Performed by: INTERNAL MEDICINE

## 2022-10-23 PROCEDURE — 83735 ASSAY OF MAGNESIUM: CPT

## 2022-10-23 PROCEDURE — 74011000250 HC RX REV CODE- 250: Performed by: INTERNAL MEDICINE

## 2022-10-23 PROCEDURE — 71045 X-RAY EXAM CHEST 1 VIEW: CPT

## 2022-10-23 PROCEDURE — 65660000004 HC RM CVT STEPDOWN

## 2022-10-23 PROCEDURE — 74011250637 HC RX REV CODE- 250/637: Performed by: INTERNAL MEDICINE

## 2022-10-23 PROCEDURE — 74011250636 HC RX REV CODE- 250/636: Performed by: INTERNAL MEDICINE

## 2022-10-23 PROCEDURE — 71250 CT THORAX DX C-: CPT

## 2022-10-23 PROCEDURE — 80048 BASIC METABOLIC PNL TOTAL CA: CPT

## 2022-10-23 PROCEDURE — 85027 COMPLETE CBC AUTOMATED: CPT

## 2022-10-23 RX ORDER — LIDOCAINE 4 G/100G
2 PATCH TOPICAL EVERY 24 HOURS
Status: DISCONTINUED | OUTPATIENT
Start: 2022-10-23 | End: 2022-10-28 | Stop reason: HOSPADM

## 2022-10-23 RX ADMIN — FAMOTIDINE 20 MG: 20 TABLET ORAL at 18:42

## 2022-10-23 RX ADMIN — SODIUM CHLORIDE, PRESERVATIVE FREE 10 ML: 5 INJECTION INTRAVENOUS at 05:00

## 2022-10-23 RX ADMIN — MORPHINE SULFATE 4 MG: 4 INJECTION INTRAVENOUS at 05:44

## 2022-10-23 RX ADMIN — ACETAMINOPHEN 1000 MG: 500 TABLET ORAL at 14:26

## 2022-10-23 RX ADMIN — OXYCODONE HYDROCHLORIDE 10 MG: 5 TABLET ORAL at 17:44

## 2022-10-23 RX ADMIN — SODIUM CHLORIDE, PRESERVATIVE FREE 10 ML: 5 INJECTION INTRAVENOUS at 22:00

## 2022-10-23 RX ADMIN — OXYCODONE HYDROCHLORIDE 10 MG: 5 TABLET ORAL at 09:14

## 2022-10-23 RX ADMIN — SODIUM CHLORIDE, PRESERVATIVE FREE 10 ML: 5 INJECTION INTRAVENOUS at 14:28

## 2022-10-23 RX ADMIN — OXYCODONE HYDROCHLORIDE 10 MG: 5 TABLET ORAL at 21:52

## 2022-10-23 RX ADMIN — ACETAMINOPHEN 1000 MG: 500 TABLET ORAL at 04:58

## 2022-10-23 RX ADMIN — MORPHINE SULFATE 4 MG: 4 INJECTION INTRAVENOUS at 14:38

## 2022-10-23 RX ADMIN — ACETAMINOPHEN 1000 MG: 500 TABLET ORAL at 21:52

## 2022-10-23 NOTE — ROUTINE PROCESS
Wound Prevention Checklist    Patient: Soraya Barlow (31 y.o. male)  Date: 10/23/2022  Diagnosis: Pneumothorax [J93.9] <principal problem not specified>    Precautions:         []  Heel prevention boots placed on patient    [x]  Patient turned q2h during shift    []  Lift team ordered    [x]  Patient on [place] bed/Specialty bed    []  Each Wound is documented during shift (Stage, Color, drainage, odor, measurements, and dressings)    Full skin assessment performed. No pressure injuries noted. Chest tube puncture site posterior left upper back. Dressing clean, dry and intact.      [x]  Dual skin check done with KAVEH Mariscal RN

## 2022-10-23 NOTE — ROUTINE PROCESS
SBAR report given by Swati Mccarty RN offgoing nurse to Aislinn EDWARDS RN and Nuria Puga RN oncoming nurses. 0725: Oncoming nurses introduced to patient by offgoing nurses. Bedside shift report conducted. Patient assessment and skin assessment performed. Patient has no pressure injuries noted. Patient has a chest tube puncture site. Chest tube checked for output. Dressing is clean, dry and intact. 0546: Patient left off the unit with nurse on cardiac monitor for a CT scan of the chest.     0905: Patient returned to unit from CT. Pulmonary at patient bedside to discuss plan of care. Patient has had a uneventful day. Patient is voiding regularly using the urinal at bedside. Incentive spirometry is being utilized by patient with nurse encouragement. Patient has spoken to friends and family throughout the day. Pain medications have been administered due to frequent reports on pain in back and rib cage. Patient reassessed by nurse after administration of pain medications. Pain moderately controlled with medications. Patient has bilateral weakness in legs and can stand for short periods of time. Vitals remain stable. IVs flushed and remain patent. Patient has a new order for lidocaine patches. SBAR report given by Nuria Puga RN offgoing nurse to KAVEH Shook oncoming nurse.

## 2022-10-23 NOTE — PROGRESS NOTES
Care Management Interventions  PCP Verified by CM: No  Palliative Care Criteria Met (RRAT>21 & CHF Dx)?: No  Mode of Transport at Discharge: Other (see comment) (family)  Transition of Care Consult (CM Consult): Discharge Planning  Support Systems: Other Family Member(s)  Confirm Follow Up Transport: Family  Discharge Location  Patient Expects to be Discharged to[de-identified] Home      Per pt, he lives with his brother and sister in-law and independent. No home DMEs. He does not have insurance and no pcp. He stated he used to have insurance in [place]. He is interested in applying for Medicaid though he works and makes about $1,500/month. He stated when ready for discharge his brother will be picking him up and if not, he will take uber home.             EDITH CoulterN RN  Care Management  Pager: [phone]

## 2022-10-23 NOTE — PROGRESS NOTES
Problem: Falls - Risk of  Goal: *Absence of Falls  Description: Document Filiberto Blight Fall Risk and appropriate interventions in the flowsheet.   Outcome: Progressing Towards Goal  Note: Fall Risk Interventions:  Mobility Interventions: Bed/chair exit alarm, Patient to call before getting OOB         Medication Interventions: Assess postural VS orthostatic hypotension, Bed/chair exit alarm, Patient to call before getting OOB, Teach patient to arise slowly         History of Falls Interventions: Bed/chair exit alarm, Evaluate medications/consider consulting pharmacy, Room close to nurse's station         Problem: Pain  Goal: *Control of Pain  Outcome: Progressing Towards Goal     Problem: Tissue Perfusion - Cardiopulmonary, Altered  Goal: *Optimize tissue perfusion  Outcome: Progressing Towards Goal  Goal: *Absence of hypoxia  Outcome: Progressing Towards Goal

## 2022-10-23 NOTE — PROGRESS NOTES
Problem: Falls - Risk of  Goal: *Absence of Falls  Description: Document [place] Fall Risk and appropriate interventions in the flowsheet.   Outcome: Progressing Towards Goal  Note: Fall Risk Interventions:            Medication Interventions: Patient to call before getting OOB         History of Falls Interventions: Door open when patient unattended, Room close to nurse's station         Problem: Patient Education: Go to Patient Education Activity  Goal: Patient/Family Education  Outcome: Progressing Towards Goal     Problem: Pain  Goal: *Control of Pain  Outcome: Progressing Towards Goal     Problem: Tissue Perfusion - Cardiopulmonary, Altered  Goal: *Optimize tissue perfusion  Outcome: Progressing Towards Goal  Goal: *Absence of hypoxia  Outcome: Progressing Towards Goal     Problem: Patient Education: Go to Patient Education Activity  Goal: Patient/Family Education  Outcome: Progressing Towards Goal

## 2022-10-23 NOTE — PROGRESS NOTES
[facility]  [facility]    Patient: Norman Agustin Age: 33 y.o. : 1995 MR#: [id number] SSN: [id number]  Date/Time: 10/23/2022     C/C: Shortness of breath      Subjective:   HPI : Patient with history of tobacco abuse marijuana use vaping, admitted with tension pneumothorax s/p chest tube          Review of Systems: Patient is alert awake oriented, no chest pain no shortness of breath no distress  positive responses in bold type   Constitutional: Negative for fever, chills, diaphoresis and unexpected weight change. HENT: Negative for ear pain, congestion, sore throat, rhinorrhea, drooling, trouble swallowing, neck pain and tinnitus. Eyes: Negative for photophobia, pain, redness and visual disturbance. Respiratory: negative for shortness of breath, cough, choking, chest tightness, wheezing or stridor. Cardiovascular: Negative for chest pain, palpitations and leg swelling. Gastrointestinal: Negative for nausea, vomiting, abdominal pain, diarrhea, constipation, blood in stool, abdominal distention and anal bleeding. Genitourinary: Negative for dysuria, urgency, frequency, hematuria, flank pain and difficulty urinating. Musculoskeletal: Negative for back pain and arthralgias. Skin: Negative for color change, rash and wound. Neurological: Negative for dizziness, seizures, syncope, speech difficulty, light-headedness or headaches. Hematological: Does not bruise/bleed easily. Psychiatric/Behavioral: Negative for suicidal ideas, hallucinations, behavioral problems, self-injury or agitation     Assessment/Plan:     1.   Acute shortness of breath  2 spontaneous pneumothorax left side  3 tobacco abuse-counseled regarding smoking cessation  4 UDS positive for iron deficiency    Plan    -S/p chest tube continue chest tube 24 hours, periodic chest x-rays-today's chest exercises tiny residual pneumothorax  -Supplemental oxygen maintain O2 saturation above 92%  -Follow CT chest      Objective:       General:  Alert, cooperative, no acute distress   HEENT: No facial asymmetry, LEXUS Garth, External ears - WNL    Cardiovascular: S1S2 - regular , No Murmur   Pulmonary: Equal expansion , No Use of accessory muscles , No Rales No Rhonchi    GI:  +BS in all four quadrants, soft, non-tender  Extremities:  No edema; 2+ dorsalis pedis pulses bilaterally  Neuro: Alert and oriented X 2.        DVT Prophylaxis:  []Lovenox  []Hep SQ  []SCDs  []Coumadin   []On Heparin gtt    [] Eliquis [] Xarelto     Vitals:         VS: Visit Vitals  /82   Pulse (!) 52   Temp 98.3 °F (36.8 °C)   Resp 16   Ht 5' 11\" (1.803 m)   Wt 80 kg (176 lb 5.9 oz)   SpO2 97%   BMI 24.60 kg/m²      Tmax/24hrs: Temp (24hrs), Av.3 °F (36.8 °C), Min:97.9 °F (36.6 °C), Max:98.7 °F (37.1 °C)        Medications:   Current Facility-Administered Medications   Medication Dose Route Frequency    sodium chloride (NS) flush 5-40 mL  5-40 mL IntraVENous Q8H    sodium chloride (NS) flush 5-40 mL  5-40 mL IntraVENous PRN    acetaminophen (TYLENOL) tablet 650 mg  650 mg Oral Q6H PRN    Or    acetaminophen (TYLENOL) suppository 650 mg  650 mg Rectal Q6H PRN    polyethylene glycol (MIRALAX) packet 17 g  17 g Oral DAILY PRN    promethazine (PHENERGAN) tablet 12.5 mg  12.5 mg Oral Q6H PRN    Or    ondansetron (ZOFRAN) injection 4 mg  4 mg IntraVENous Q6H PRN    famotidine (PEPCID) tablet 20 mg  20 mg Oral QPM    albuterol-ipratropium (DUO-NEB) 2.5 MG-0.5 MG/3 ML  3 mL Nebulization Q6H PRN    acetaminophen (TYLENOL) tablet 1,000 mg  1,000 mg Oral Q8H    oxyCODONE IR (ROXICODONE) tablet 5-10 mg  5-10 mg Oral Q4H PRN    morphine injection 4 mg  4 mg IntraVENous Q4H PRN       Labs:    Recent Labs     10/23/22  0525 10/22/22  0859   WBC 10.1 7.7   HGB 14.5 15.0   HCT 45.3 46.7    300     Recent Labs     10/23/22  0525 10/22/22  0859    140   K 4.2 4.1    108   CO2 30 26   * 98   BUN 6* 11   CREA 1.06 1.01 CA 9.3 9.8   MG 1.8  --    ALB  --  4.1   ALT  --  25         Time spent on direct patient care >30 mints     Complexity : High complex - due to multiple medical issues outlined above. CODE Status : Full code    Case discussed with:  [x]Patient  [] Family  []Nursing  []Case Management         Disclaimer: Sections of this note are dictated utilizing voice recognition software, which may have resulted in some phonetic based errors in grammar and contents. Even though attempts were made to correct all the mistakes, some may have been missed, and remained in the body of the document. If questions arise, please contact our department.     Signed By: Radha Carver MD     October 23, 2022

## 2022-10-23 NOTE — CONSULTS
[facility]  Pulmonary, Critical Care, and Sleep Medicine    Name: Lorri Castorena MRN: [id number]   : 1995 Hospital: [address] [place] [address] Dr   Date: 10/23/2022        Pulmonary Initial In-Patient Consult                                              Consult requesting physician: Dr. Kvng Martinez  Reason for Consult: Spontaneous pneumothorax    IMPRESSION:   Spontaneous pneumothorax on left side now resolved s/p chest tube placement. Tobacco abuse  Microcytosis: thalassemia versus iron deficiency  THC + on drug screen        RECOMMENDATIONS:   Continue chest tube on suction for the next 24 hours  Daily CXR  Titrate O2 to maintain sats > 92%. Non-contrast chest CT to evaluate lung parenchyma is pending  Recommend work up of microcytosis- consider iron studies, hemoglobin electrophoresis  Optimize pain management  Nutrition: per primary team  Replace electrolytes  HOB >=30 degree, aggressive pulmonary toileting, incentive spirometry, PT/OT eval and treat  GI Prophylaxis: per primary team  not indicated  DVT Prophylaxis: SCDs recommended   Smoking cessation counseling done by me and spent >11 minutes on it. Further recommendations will be based on the patient's response to recommended treatment and results of the investigation ordered. Subjective/History:     Lorri Castorena is a 33 y.o. male with PMHx significant for tobacco use who had sudden onset this morning of left sided chest pain and dyspnea when we awoke from sleep. No previous ill contacts, other symptoms or trauma. Upon presentation to ED, left sided pneumothorax diagnosed and chest tube placed on left side without complication and noted to have full lung re-expansion.   Currently has moderate left sided pain and has no dyspnea on 4 L/min of supplemental O2.      10/23/2022  Pain improved  CT scan pending  Air leak in pleuravac much reduced but still occasionally observed during my exam        Review of Systems:  A comprehensive review of systems was negative except for that written in the HPI. No Known Allergies     History reviewed. No pertinent past medical history. History reviewed. No pertinent surgical history.      Family History   Problem Relation Age of Onset    Cancer Father         Social History     Tobacco Use    Smoking status: Every Day     Types: Cigarettes    Smokeless tobacco: Never   Substance Use Topics    Alcohol use: Never      Prior to Admission medications    Not on File       Current Facility-Administered Medications   Medication Dose Route Frequency    sodium chloride (NS) flush 5-40 mL  5-40 mL IntraVENous Q8H    sodium chloride (NS) flush 5-40 mL  5-40 mL IntraVENous PRN    acetaminophen (TYLENOL) tablet 650 mg  650 mg Oral Q6H PRN    Or    acetaminophen (TYLENOL) suppository 650 mg  650 mg Rectal Q6H PRN    polyethylene glycol (MIRALAX) packet 17 g  17 g Oral DAILY PRN    promethazine (PHENERGAN) tablet 12.5 mg  12.5 mg Oral Q6H PRN    Or    ondansetron (ZOFRAN) injection 4 mg  4 mg IntraVENous Q6H PRN    famotidine (PEPCID) tablet 20 mg  20 mg Oral QPM    albuterol-ipratropium (DUO-NEB) 2.5 MG-0.5 MG/3 ML  3 mL Nebulization Q6H PRN    acetaminophen (TYLENOL) tablet 1,000 mg  1,000 mg Oral Q8H    oxyCODONE IR (ROXICODONE) tablet 5-10 mg  5-10 mg Oral Q4H PRN    morphine injection 4 mg  4 mg IntraVENous Q4H PRN         Objective:   Vital Signs:    Visit Vitals  /69   Pulse (!) 56   Temp 97.9 °F (36.6 °C)   Resp 16   Ht 5' 11\" (1.803 m)   Wt 77.1 kg (170 lb)   SpO2 99%   BMI 23.71 kg/m²       O2 Device: None (Room air)   O2 Flow Rate (L/min): 4 l/min   Temp (24hrs), Av.2 °F (36.8 °C), Min:97.6 °F (36.4 °C), Max:98.7 °F (37.1 °C)       Intake/Output:   Last shift:      10/23 0701 - 10/23 1900  In: -   Out: 700 [Urine:700]  Last 3 shifts: 10/21 1901 - 10/23 07  In: 840 [P.O.:840]  Out: 800 [Urine:800]    Intake/Output Summary (Last 24 hours) at 10/23/2022 3171  Last data filed at 10/23/2022 6711  Gross per 24 hour   Intake 840 ml   Output 1500 ml   Net -660 ml       Physical Exam:     General:  Alert, Awake, NAD, cooperative, no distress, appears stated age. Head:   Normocephalic, without obvious abnormality, atraumatic. Eye:   Conjunctivae/corneas clear. PERRLA, no scleral icterus, no pallor, no cyanosis  Nose:   Nares normal. Septum midline. Mucosa normal without erythema/exudate. No drainage/discharge. No sinus tenderness. Throat:  Lips, mucosa, and tongue normal. Teeth and gums normal. No tonsillar enlargement, no erythema, no exudates, no oral thrush  Neck:   Supple, symmetric, thyroid: no enlargement/tenderness/nodule, no JVD, no carotid bruit, no lymphadenopathy. Trachea midline  Back & spine: Symmetric, no curvature. Chest wall: Chest tube in place left thorax  Lung:   Adequate air entry bilateral equal, no rales, no rhonchi, no wheezing. No dullness on percussion. Heart:   Regular rate & rhythm. S1 S2 present, no murmur, no gallop, no click, no rub  Abdomen:  Soft, NT, ND, +BS, no masses, no organomegaly  Extremities:  No pedal edema, no cyanosis, no clubbing  Pulses: 2+ and symmetric in DP  Lymphatic:  No cervical, supraclavicular and axillary palpable lymphadenopathy. Musculoskeletal: No joint swelling or tenderness. Neurologic:  Grossly non focal.        Data:         Chemistry Recent Labs     10/23/22  0525 10/22/22  0859   * 98    140   K 4.2 4.1    108   CO2 30 26   BUN 6* 11   CREA 1.06 1.01   CA 9.3 9.8   MG 1.8  --    AGAP 2* 6   BUCR 6* 11*   AP  --  60   TP  --  7.3   ALB  --  4.1   GLOB  --  3.2   AGRAT  --  1.3          Lactic Acid No results found for: LAC  No results for input(s): LAC in the last 72 hours.      Liver Enzymes Protein, total   Date Value Ref Range Status   10/22/2022 7.3 6.4 - 8.2 g/dL Final     Albumin   Date Value Ref Range Status   10/22/2022 4.1 3.4 - 5.0 g/dL Final     Globulin   Date Value Ref Range Status   10/22/2022 3.2 2.0 - 4.0 g/dL Final     A-G Ratio   Date Value Ref Range Status   10/22/2022 1.3 0.8 - 1.7   Final     Alk. phosphatase   Date Value Ref Range Status   10/22/2022 60 45 - 117 U/L Final     Recent Labs     10/22/22  0859   TP 7.3   ALB 4.1   GLOB 3.2   AGRAT 1.3   AP 60          CBC w/Diff Recent Labs     10/23/22  0525 10/22/22  0859   WBC 10.1 7.7   RBC 6.21* 6.43*   HGB 14.5 15.0   HCT 45.3 46.7    300   GRANS  --  62   LYMPH  --  29   EOS  --  2          Cardiac Enzymes No results found for: CPK, CK, CKMMB, CKMB, RCK3, CKMBT, CKNDX, CKND1, RAMBO, TROPT, TROIQ, BINTA, TROPT, TNIPOC, BNP, BNPP     BNP No results found for: BNP, BNPP, XBNPT     Coagulation No results for input(s): PTP, INR, APTT, INREXT, INREXT in the last 72 hours. Thyroid  No results found for: T4, T3U, TSH, TSHEXT, TSHEXT       Lipid Panel No results found for: CHOL, CHOLPOCT, CHOLX, CHLST, CHOLV, 603198, HDL, HDLP, LDL, LDLC, DLDLP, 221602, VLDLC, VLDL, TGLX, TRIGL, TRIGP, TGLPOCT, CHHD, CHHDX     ABG No results for input(s): PHI, PHI, POC2, PCO2I, PO2, PO2I, HCO3, HCO3I, FIO2, FIO2I in the last 72 hours. Urinalysis No results found for: COLOR, APPRN, SPGRU, REFSG, SHAHNAZ, PROTU, GLUCU, KETU, BILU, UROU, JAMES, LEUKU, GLUKE, EPSU, BACTU, WBCU, RBCU, CASTS, UCRY     Micro  No results for input(s): SDES, CULT in the last 72 hours. No results for input(s): CULT in the last 72 hours. XR (Most Recent). CXR reviewed by me and compared with previous CXR Results from Hospital Encounter encounter on 10/22/22    XR CHEST PORT    Narrative  EXAM: PORTABLE CHEST 0802 hours    CLINICAL HISTORY/INDICATION: left sided pneumothorax s/p chest tube placement  yesterday - CXR for daily monitoring. COMPARISON: Chest x-ray 10/22 pre and post chest tube placement for tension  pneumothorax. TECHNIQUE: Single AP view    FINDINGS:    No change in the position of the left chest tube terminating medially at about  the level of the aortic arch.  Trace residual pneumothorax measuring 5 mm at the  apex. Minimal band of subsegmental atelectasis at the left medial lung base. Minimal discoid atelectasis of the left lateral base. The costophrenic angles  are sharp. Pulmonary vascularity is normal. The heart is normal in size. Tiny amount of subcutaneous emphysema at the left chest wall. Impression  Tiny trace residual left pneumothorax. Minimal subsegmental atelectasis at the left base. CT (Most Recent) No results found for this or any previous visit. EKG No results found for this or any previous visit. ECHO No results found for this or any previous visit. PFT No flowsheet data found.      Other ASA reactivity:   Pre-albumin:   Ionized Calcium:   NH4:   T3, FT4:  Cortisol:  Urine Osm:  Urine Lytes:   HbA1c:      Recent Results (from the past 24 hour(s))   DRUG SCREEN, URINE    Collection Time: 10/22/22  1:10 PM   Result Value Ref Range    BENZODIAZEPINES Negative NEG      BARBITURATES Negative NEG      THC (TH-CANNABINOL) Positive (A) NEG      OPIATES Negative NEG      PCP(PHENCYCLIDINE) Negative NEG      COCAINE Negative NEG      AMPHETAMINES Negative NEG      METHADONE Negative NEG      HDSCOM (NOTE)    METABOLIC PANEL, BASIC    Collection Time: 10/23/22  5:25 AM   Result Value Ref Range    Sodium 137 136 - 145 mmol/L    Potassium 4.2 3.5 - 5.5 mmol/L    Chloride 105 100 - 111 mmol/L    CO2 30 21 - 32 mmol/L    Anion gap 2 (L) 3.0 - 18 mmol/L    Glucose 109 (H) 74 - 99 mg/dL    BUN 6 (L) 7.0 - 18 MG/DL    Creatinine 1.06 0.6 - 1.3 MG/DL    BUN/Creatinine ratio 6 (L) 12 - 20      eGFR >60 >60 ml/min/1.73m2    Calcium 9.3 8.5 - 10.1 MG/DL   MAGNESIUM    Collection Time: 10/23/22  5:25 AM   Result Value Ref Range    Magnesium 1.8 1.6 - 2.6 mg/dL   CBC W/O DIFF    Collection Time: 10/23/22  5:25 AM   Result Value Ref Range    WBC 10.1 4.6 - 13.2 K/uL    RBC 6.21 (H) 4.35 - 5.65 M/uL    HGB 14.5 13.0 - 16.0 g/dL    HCT 45.3 36.0 - 48.0 %    MCV 72.9 (L) 78.0 - 100.0 FL    MCH 23.3 (L) 24.0 - 34.0 PG    MCHC 32.0 31.0 - 37.0 g/dL    RDW 16.7 (H) 11.6 - 14.5 %    PLATELET 138 591 - 705 K/uL    MPV 10.9 9.2 - 11.8 FL    NRBC 0.0 0  WBC    ABSOLUTE NRBC 0.00 0.00 - 0.01 K/uL         Telemetry:normal sinus rhythm    The patient is: [x] acutely ill Risk of deterioration: [x] moderate    [] critically ill  [] high     [x]See my orders for details    My assessment, plan of care, findings, medications, side effects etc were discussed with:  [] Nurse [] PT/OT    [] Respiratory therapy [x] Dr. Rubio Neither   [] Family: answered all questions to satisfaction [x] Patient: answered all questions to satisfaction   [] Pharmacist []      [x] Total critical care time exclusive of procedures 30 minutes with complex decision making, coordination of care and counseling patient performed and > 50% time spent in face to face evaluation.       Raisa Blair MD  10/23/2022

## 2022-10-24 ENCOUNTER — APPOINTMENT (OUTPATIENT)
Dept: GENERAL RADIOLOGY | Age: 27
DRG: 121 | End: 2022-10-24
Attending: INTERNAL MEDICINE
Payer: MEDICAID

## 2022-10-24 LAB
ANION GAP SERPL CALC-SCNC: 5 MMOL/L (ref 3–18)
BUN SERPL-MCNC: 8 MG/DL (ref 7–18)
BUN/CREAT SERPL: 9 (ref 12–20)
CALCIUM SERPL-MCNC: 9.1 MG/DL (ref 8.5–10.1)
CHLORIDE SERPL-SCNC: 104 MMOL/L (ref 100–111)
CO2 SERPL-SCNC: 29 MMOL/L (ref 21–32)
CREAT SERPL-MCNC: 0.92 MG/DL (ref 0.6–1.3)
ERYTHROCYTE [DISTWIDTH] IN BLOOD BY AUTOMATED COUNT: 16.2 % (ref 11.6–14.5)
GLUCOSE SERPL-MCNC: 79 MG/DL (ref 74–99)
HCT VFR BLD AUTO: 45.6 % (ref 36–48)
HGB BLD-MCNC: 14.5 G/DL (ref 13–16)
MAGNESIUM SERPL-MCNC: 1.8 MG/DL (ref 1.6–2.6)
MCH RBC QN AUTO: 22.9 PG (ref 24–34)
MCHC RBC AUTO-ENTMCNC: 31.8 G/DL (ref 31–37)
MCV RBC AUTO: 72 FL (ref 78–100)
NRBC # BLD: 0 K/UL (ref 0–0.01)
NRBC BLD-RTO: 0 PER 100 WBC
PLATELET # BLD AUTO: 266 K/UL (ref 135–420)
PMV BLD AUTO: 10.6 FL (ref 9.2–11.8)
POTASSIUM SERPL-SCNC: 3.8 MMOL/L (ref 3.5–5.5)
RBC # BLD AUTO: 6.33 M/UL (ref 4.35–5.65)
SODIUM SERPL-SCNC: 138 MMOL/L (ref 136–145)
WBC # BLD AUTO: 10.3 K/UL (ref 4.6–13.2)

## 2022-10-24 PROCEDURE — 80048 BASIC METABOLIC PNL TOTAL CA: CPT

## 2022-10-24 PROCEDURE — 94762 N-INVAS EAR/PLS OXIMTRY CONT: CPT

## 2022-10-24 PROCEDURE — 74011250637 HC RX REV CODE- 250/637: Performed by: INTERNAL MEDICINE

## 2022-10-24 PROCEDURE — 74011000250 HC RX REV CODE- 250: Performed by: INTERNAL MEDICINE

## 2022-10-24 PROCEDURE — 36415 COLL VENOUS BLD VENIPUNCTURE: CPT

## 2022-10-24 PROCEDURE — 65660000004 HC RM CVT STEPDOWN

## 2022-10-24 PROCEDURE — 99232 SBSQ HOSP IP/OBS MODERATE 35: CPT | Performed by: INTERNAL MEDICINE

## 2022-10-24 PROCEDURE — 83735 ASSAY OF MAGNESIUM: CPT

## 2022-10-24 PROCEDURE — 71045 X-RAY EXAM CHEST 1 VIEW: CPT

## 2022-10-24 PROCEDURE — 85027 COMPLETE CBC AUTOMATED: CPT

## 2022-10-24 RX ADMIN — ACETAMINOPHEN 1000 MG: 500 TABLET ORAL at 13:05

## 2022-10-24 RX ADMIN — FAMOTIDINE 20 MG: 20 TABLET ORAL at 18:13

## 2022-10-24 RX ADMIN — SODIUM CHLORIDE, PRESERVATIVE FREE 10 ML: 5 INJECTION INTRAVENOUS at 14:00

## 2022-10-24 RX ADMIN — OXYCODONE HYDROCHLORIDE 10 MG: 5 TABLET ORAL at 04:53

## 2022-10-24 RX ADMIN — ACETAMINOPHEN 1000 MG: 500 TABLET ORAL at 21:36

## 2022-10-24 RX ADMIN — OXYCODONE HYDROCHLORIDE 10 MG: 5 TABLET ORAL at 13:14

## 2022-10-24 RX ADMIN — SODIUM CHLORIDE, PRESERVATIVE FREE 10 ML: 5 INJECTION INTRAVENOUS at 05:02

## 2022-10-24 RX ADMIN — ACETAMINOPHEN 1000 MG: 500 TABLET ORAL at 04:53

## 2022-10-24 RX ADMIN — OXYCODONE HYDROCHLORIDE 10 MG: 5 TABLET ORAL at 19:53

## 2022-10-24 RX ADMIN — SODIUM CHLORIDE, PRESERVATIVE FREE 10 ML: 5 INJECTION INTRAVENOUS at 21:36

## 2022-10-24 NOTE — ROUTINE PROCESS
1948:Bedside and Verbal shift change report given to [address] [place] [address] (oncoming nurse) by Aislinn LINN (offgoing nurse). Report included the following information SBAR, Kardex, Intake/Output, MAR, Recent Results, and Cardiac Rhythm SR . On the phone. No SOB on RA. Left chest tube to water seal. Sanguineous drainage noted. No leak. No crepitus. Call light within reach. 1953: Medicated for pain per patient request.     2136: Due meds given. HS snack provided. 2359: No change from previous assessment. 7769: No change from previous assessment. 4667: Medicated for pain per patient request.     0510: Due meds given. 0600: Slept good thru night. Needs attended. 0740: Bedside and Verbal shift change report given to [facility] (oncoming nurse) by Pedro Dwyer RN (offgoing nurse). Report included the following information SBAR, Kardex, Intake/Output, MAR, Recent Results, and Cardiac Rhythm SR w/ PVC's .

## 2022-10-24 NOTE — ROUTINE PROCESS
SBAR report [facility] [place] by off going nurse Paula Maldonado RN to April G., on coming nurse. Patient had an uneventful day. Up in his bed for most of the day. Patient's pain was tolerable most of the day. Medicated x1 with Oxy IR 10 mg and scheduled Tylenol. Lidocaine patches were applied to left chest anterior and posterior wall. Chest tube dressing was re-enforced today. Chest tube was placed on water seal/ gravity this morning by vascular team.   No other changes to patient's status to report. Appetite has been good and patient voids well. SBAR report given to [facility] V.,RN on coming nurse.

## 2022-10-24 NOTE — PROGRESS NOTES
[facility]    Patient: Leonor Joiner Age: 33 y.o. : 1995 MR#: [id number] SSN: [id number]  Date/Time: 10/24/2022     C/C: Shortness of breath      Subjective:   HPI : Patient with history of tobacco abuse marijuana use vaping, admitted with tension pneumothorax s/p chest tube          Review of Systems: Patient is alert awake oriented, no chest pain no shortness of breath no distress  positive responses in bold type   Constitutional: Negative for fever, chills, diaphoresis and unexpected weight change. HENT: Negative for ear pain, congestion, sore throat, rhinorrhea, drooling, trouble swallowing, neck pain and tinnitus. Eyes: Negative for photophobia, pain, redness and visual disturbance. Respiratory: negative for shortness of breath, cough, choking, chest tightness, wheezing or stridor. Cardiovascular: Negative for chest pain, palpitations and leg swelling. Gastrointestinal: Negative for nausea, vomiting, abdominal pain, diarrhea, constipation, blood in stool, abdominal distention and anal bleeding. Genitourinary: Negative for dysuria, urgency, frequency, hematuria, flank pain and difficulty urinating. Musculoskeletal: Negative for back pain and arthralgias. Skin: Negative for color change, rash and wound. Neurological: Negative for dizziness, seizures, syncope, speech difficulty, light-headedness or headaches. Hematological: Does not bruise/bleed easily. Psychiatric/Behavioral: Negative for suicidal ideas, hallucinations, behavioral problems, self-injury or agitation     Assessment/Plan:     1.   Acute shortness of breath  2 spontaneous pneumothorax left side  3 tobacco abuse-counseled regarding smoking cessation  4 UDS positive for iron deficiency    Plan    -Left spontaneous pneumothorax resolved after chest tube placement, patient is asymptomatic now, serial x-rays are being done, pulmonary following, plan is to repeat x-rays in the morning some removal of the chest tube    -CT chest suggestive of small apical medial bulbs-     - Current observation : Air leak present - Now on water seal so monitor for any air leak -       Objective:       General:  Alert, cooperative, no acute distress   HEENT: No facial asymmetry, LEXUS Garth, External ears - WNL    Cardiovascular: S1S2 - regular , No Murmur   Pulmonary: Equal expansion , No Use of accessory muscles , No Rales No Rhonchi    GI:  +BS in all four quadrants, soft, non-tender  Extremities:  No edema; 2+ dorsalis pedis pulses bilaterally  Neuro: Alert and oriented X 2.        DVT Prophylaxis:  []Lovenox  []Hep SQ  []SCDs  []Coumadin   []On Heparin gtt    [] Eliquis [] Xarelto     Vitals:         VS: Visit Vitals  BP (!) 166/66   Pulse (!) 58   Temp 97.8 °F (36.6 °C)   Resp 16   Ht 5' 11\" (1.803 m)   Wt 80 kg (176 lb 5.9 oz)   SpO2 99%   BMI 24.60 kg/m²      Tmax/24hrs: Temp (24hrs), Av.2 °F (36.8 °C), Min:97.8 °F (36.6 °C), Max:98.3 °F (36.8 °C)        Medications:   Current Facility-Administered Medications   Medication Dose Route Frequency    lidocaine 4 % patch 2 Patch  2 Patch TransDERmal Q24H    sodium chloride (NS) flush 5-40 mL  5-40 mL IntraVENous Q8H    sodium chloride (NS) flush 5-40 mL  5-40 mL IntraVENous PRN    acetaminophen (TYLENOL) tablet 650 mg  650 mg Oral Q6H PRN    Or    acetaminophen (TYLENOL) suppository 650 mg  650 mg Rectal Q6H PRN    polyethylene glycol (MIRALAX) packet 17 g  17 g Oral DAILY PRN    promethazine (PHENERGAN) tablet 12.5 mg  12.5 mg Oral Q6H PRN    Or    ondansetron (ZOFRAN) injection 4 mg  4 mg IntraVENous Q6H PRN    famotidine (PEPCID) tablet 20 mg  20 mg Oral QPM    albuterol-ipratropium (DUO-NEB) 2.5 MG-0.5 MG/3 ML  3 mL Nebulization Q6H PRN    acetaminophen (TYLENOL) tablet 1,000 mg  1,000 mg Oral Q8H    oxyCODONE IR (ROXICODONE) tablet 5-10 mg  5-10 mg Oral Q4H PRN    morphine injection 4 mg  4 mg IntraVENous Q4H PRN       Labs:    Recent Labs 10/24/22  0436 10/23/22  0525 10/22/22  0859   WBC 10.3 10.1 7.7   HGB 14.5 14.5 15.0   HCT 45.6 45.3 46.7    256 300     Recent Labs     10/24/22  0436 10/23/22  0525 10/22/22  0859    137 140   K 3.8 4.2 4.1    105 108   CO2 29 30 26   GLU 79 109* 98   BUN 8 6* 11   CREA 0.92 1.06 1.01   CA 9.1 9.3 9.8   MG 1.8 1.8  --    ALB  --   --  4.1   ALT  --   --  25         Time spent on direct patient care >30 mints     Complexity : High complex - due to multiple medical issues outlined above. CODE Status : Full code    Case discussed with:  [x]Patient  [] Family  []Nursing  []Case Management         Disclaimer: Sections of this note are dictated utilizing voice recognition software, which may have resulted in some phonetic based errors in grammar and contents. Even though attempts were made to correct all the mistakes, some may have been missed, and remained in the body of the document. If questions arise, please contact our department.     Signed By: Iris Alva MD     October 24, 2022

## 2022-10-24 NOTE — ROUTINE PROCESS
Wound Prevention Checklist    Patient: Koki Mc (31 y.o. male)  Date: 10/24/2022  Diagnosis: Pneumothorax [J93.9] <principal problem not specified>    Precautions:         []  Heel prevention boots placed on patient    [x]  Patient turned q2h during shift  Patient is able to self reposition in bed and out of med with minimal assist.    []  Lift team ordered    []  Patient on [facility] bed/Specialty bed    [x]  Each Wound is documented during shift (Stage, Color, drainage, odor, measurements, and dressings)  Chest tube puncture site. [x]  Dual skin check done with Laura ANG RN  No pressure wounds.     Aislinn Veliz RN

## 2022-10-24 NOTE — PROGRESS NOTES
MCKAYLA referred patient to Gardenia Dang with [employer] to interview/assist patient for [facility] acceptance and Medicaid coverage.     Kathy Willoughby, MSW, QMHP

## 2022-10-24 NOTE — ACP (ADVANCE CARE PLANNING)
Advance Care Planning   Advance Care Planning Inpatient Note  [address] [facility] Department    Today's Date: 10/24/2022  Unit: [facility] CV STEPDOWN    Received request from . Upon review of chart and communication with care team, patient's decision making abilities are not in question. Patient was/were present in the room during visit. Goals of ACP Conversation:  Discuss Advance Care planning documents    Health Care Decision Makers:    No healthcare decision makers have been documented. Click here to complete [address] including selection of the Healthcare Decision Maker Relationship (ie \"Primary\")  Summary:  No Decision Maker named by patient at this time    Advance Care Planning Documents (Patient Wishes) on file:  None     Assessment:    Patient seen as a new admit to the hospital this morning. Asked about advance directive presence and he replied no.     Interventions:  Deferred conversation as patient not interested in completing an advance directive at this time    Care Preferences Communicated:  No    Outcomes/Plan:      Tanja Spain [facility] on 10/24/2022 at 1:22 PM

## 2022-10-24 NOTE — CONSULTS
[facility]  Pulmonary, Critical Care, and Sleep Medicine    Name: Lorri Castorena MRN: [id number]   : 1995 Hospital: [address] [place] [address]   Date: 10/24/2022        Pulmonary Follow-up In-Patient Consult                                              Consult requesting physician: Dr. Kvng Martinez  Reason for Consult: Spontaneous pneumothorax    IMPRESSION:   Spontaneous pneumothorax on left side now resolved s/p chest tube placement. Tobacco abuse  Microcytosis: thalassemia versus iron deficiency  THC + on drug screen        RECOMMENDATIONS:   Chest tube to water suction and check CXR tomorrow am  Daily CXR  Titrate O2 to maintain sats > 92%. Pain management per primary team  Nutrition: per primary team  Replace electrolytes  HOB >=30 degree, aggressive pulmonary toileting, incentive spirometry, PT/OT eval and treat  GI Prophylaxis: per primary team  not indicated  DVT Prophylaxis: SCDs recommended   Smoking and THC cessation counseling done by me and spent >11 minutes on it. Will follow     Subjective/History:     Lorri Castorena is a 33 y.o. male with PMHx significant for tobacco use who had sudden onset this morning of left sided chest pain and dyspnea when we awoke from sleep. No previous ill contacts, other symptoms or trauma. Upon presentation to ED, left sided pneumothorax diagnosed and chest tube placed on left side without complication and noted to have full lung re-expansion. Currently has moderate left sided pain and has no dyspnea on 4 L/min of supplemental O2.      10/24/2022  Pain improved; no dyspnea  CT scan showed 2 left apical bulla; largest about 2 x 3 cm  Air leak in pleuravac much reduced but still occasionally observed during my exam  CXR this am showed fully expanded lung on left        Review of Systems:  A comprehensive review of systems was negative except for that written in the HPI. No Known Allergies     History reviewed. No pertinent past medical history. History reviewed. No pertinent surgical history.      Family History   Problem Relation Age of Onset    Cancer Father         Social History     Tobacco Use    Smoking status: Every Day     Types: Cigarettes    Smokeless tobacco: Never   Substance Use Topics    Alcohol use: Never      Prior to Admission medications    Not on File       Current Facility-Administered Medications   Medication Dose Route Frequency    lidocaine 4 % patch 2 Patch  2 Patch TransDERmal Q24H    sodium chloride (NS) flush 5-40 mL  5-40 mL IntraVENous Q8H    sodium chloride (NS) flush 5-40 mL  5-40 mL IntraVENous PRN    acetaminophen (TYLENOL) tablet 650 mg  650 mg Oral Q6H PRN    Or    acetaminophen (TYLENOL) suppository 650 mg  650 mg Rectal Q6H PRN    polyethylene glycol (MIRALAX) packet 17 g  17 g Oral DAILY PRN    promethazine (PHENERGAN) tablet 12.5 mg  12.5 mg Oral Q6H PRN    Or    ondansetron (ZOFRAN) injection 4 mg  4 mg IntraVENous Q6H PRN    famotidine (PEPCID) tablet 20 mg  20 mg Oral QPM    albuterol-ipratropium (DUO-NEB) 2.5 MG-0.5 MG/3 ML  3 mL Nebulization Q6H PRN    acetaminophen (TYLENOL) tablet 1,000 mg  1,000 mg Oral Q8H    oxyCODONE IR (ROXICODONE) tablet 5-10 mg  5-10 mg Oral Q4H PRN    morphine injection 4 mg  4 mg IntraVENous Q4H PRN         Objective:   Vital Signs:    Visit Vitals  BP (!) 166/66   Pulse (!) 58   Temp 97.8 °F (36.6 °C)   Resp 16   Ht 5' 11\" (1.803 m)   Wt 80 kg (176 lb 5.9 oz)   SpO2 99%   BMI 24.60 kg/m²       O2 Device: None (Room air)   O2 Flow Rate (L/min): 0 l/min   Temp (24hrs), Av.2 °F (36.8 °C), Min:97.8 °F (36.6 °C), Max:98.3 °F (36.8 °C)       Intake/Output:   Last shift:      10/24 0701 - 10/24 1900  In: 120 [P.O.:120]  Out: 309 [Urine:300]  Last 3 shifts: 10/22 1901 - 10/24 0700  In: 1080 [P.O.:1080]  Out: 2756 [Urine:2750]    Intake/Output Summary (Last 24 hours) at 10/24/2022 0931  Last data filed at 10/24/2022 0913  Gross per 24 hour   Intake 360 ml   Output 1965 ml   Net -1605 ml Physical Exam:     General:  Alert, Awake, NAD, cooperative, no distress, appears stated age. Head:   Normocephalic, without obvious abnormality, atraumatic. Eye:   Conjunctivae/corneas clear. PERRLA, no scleral icterus, no pallor, no cyanosis  Nose:   Nares normal. Septum midline. Mucosa normal without erythema/exudate. No drainage/discharge. No sinus tenderness. Throat:  Lips, mucosa, and tongue normal. Teeth and gums normal. No tonsillar enlargement, no erythema, no exudates, no oral thrush  Neck:   Supple, symmetric, thyroid: no enlargement/tenderness/nodule, no JVD, no carotid bruit, no lymphadenopathy. Trachea midline  Back & spine: Symmetric, no curvature. Chest wall: Chest tube in place left thorax  Lung:   Adequate air entry bilateral equal, no rales, no rhonchi, no wheezing. No dullness on percussion. Heart:   Regular rate & rhythm. S1 S2 present, no murmur, no gallop, no click, no rub  Abdomen:  Soft, NT, ND, +BS, no masses, no organomegaly  Extremities:  No pedal edema, no cyanosis, no clubbing  Pulses: 2+ and symmetric in DP  Lymphatic:  No cervical, supraclavicular and axillary palpable lymphadenopathy. Musculoskeletal: No joint swelling or tenderness. Neurologic:  Grossly non focal.        Data:         Chemistry Recent Labs     10/24/22  0436 10/23/22  0525 10/22/22  0859   GLU 79 109* 98    137 140   K 3.8 4.2 4.1    105 108   CO2 29 30 26   BUN 8 6* 11   CREA 0.92 1.06 1.01   CA 9.1 9.3 9.8   MG 1.8 1.8  --    AGAP 5 2* 6   BUCR 9* 6* 11*   AP  --   --  60   TP  --   --  7.3   ALB  --   --  4.1   GLOB  --   --  3.2   AGRAT  --   --  1.3          Lactic Acid No results found for: LAC  No results for input(s): LAC in the last 72 hours.      Liver Enzymes Protein, total   Date Value Ref Range Status   10/22/2022 7.3 6.4 - 8.2 g/dL Final     Albumin   Date Value Ref Range Status   10/22/2022 4.1 3.4 - 5.0 g/dL Final     Globulin   Date Value Ref Range Status   10/22/2022 3.2 2.0 - 4.0 g/dL Final     A-G Ratio   Date Value Ref Range Status   10/22/2022 1.3 0.8 - 1.7   Final     Alk. phosphatase   Date Value Ref Range Status   10/22/2022 60 45 - 117 U/L Final     Recent Labs     10/22/22  0859   TP 7.3   ALB 4.1   GLOB 3.2   AGRAT 1.3   AP 60          CBC w/Diff Recent Labs     10/24/22  0436 10/23/22  0525 10/22/22  0859   WBC 10.3 10.1 7.7   RBC 6.33* 6.21* 6.43*   HGB 14.5 14.5 15.0   HCT 45.6 45.3 46.7    256 300   GRANS  --   --  62   LYMPH  --   --  29   EOS  --   --  2          Cardiac Enzymes No results found for: CPK, CK, CKMMB, CKMB, RCK3, CKMBT, CKNDX, CKND1, RAMBO, TROPT, TROIQ, BINTA, TROPT, TNIPOC, BNP, BNPP     BNP No results found for: BNP, BNPP, XBNPT     Coagulation No results for input(s): PTP, INR, APTT, INREXT, INREXT in the last 72 hours. Thyroid  No results found for: T4, T3U, TSH, TSHEXT, TSHEXT       Lipid Panel No results found for: CHOL, CHOLPOCT, CHOLX, CHLST, CHOLV, 127554, HDL, HDLP, LDL, LDLC, DLDLP, 509974, VLDLC, VLDL, TGLX, TRIGL, TRIGP, TGLPOCT, CHHD, CHHDX     ABG No results for input(s): PHI, PHI, POC2, PCO2I, PO2, PO2I, HCO3, HCO3I, FIO2, FIO2I in the last 72 hours. Urinalysis No results found for: COLOR, APPRN, SPGRU, REFSG, SHAHNAZ, PROTU, GLUCU, KETU, BILU, UROU, JAMES, LEUKU, GLUKE, EPSU, BACTU, WBCU, RBCU, CASTS, UCRY     Micro  No results for input(s): SDES, CULT in the last 72 hours. No results for input(s): CULT in the last 72 hours. XR (Most Recent). CXR reviewed by me and compared with previous CXR Results from Hospital Encounter encounter on 10/22/22    XR CHEST PORT    Narrative  EXAM: PORTABLE CHEST 0802 hours    CLINICAL HISTORY/INDICATION: left sided pneumothorax s/p chest tube placement  yesterday - CXR for daily monitoring. COMPARISON: Chest x-ray 10/22 pre and post chest tube placement for tension  pneumothorax.     TECHNIQUE: Single AP view    FINDINGS:    No change in the position of the left chest tube terminating medially at about  the level of the aortic arch. Trace residual pneumothorax measuring 5 mm at the  apex. Minimal band of subsegmental atelectasis at the left medial lung base. Minimal discoid atelectasis of the left lateral base. The costophrenic angles  are sharp. Pulmonary vascularity is normal. The heart is normal in size. Tiny amount of subcutaneous emphysema at the left chest wall. Impression  Tiny trace residual left pneumothorax. Minimal subsegmental atelectasis at the left base. CT (Most Recent) Results from Hospital Encounter encounter on 10/22/22    CT CHEST WO CONT    Narrative  EXAM: CT CHEST WITHOUT CONTRAST. CLINICAL HISTORY/INDICATION:  32 yo man with no past medical history but is a  smoker had acute onset left sided pneumothorax now s/p chest tube. Please  evaluate with CT scan for any anatomic/parenchymal abnormality that may have  contributed to PTX.    COMPARISON: Chest x-ray 10/22, 10/23/2022. TECHNIQUE: Standard helical images were obtained from the thoracic inlet through  the adrenals at 5 mm thick sections without intravenous contrast.  Coronal and sagittal reformations obtained. Images were reviewed on both soft  tissue, lung, and bone window settings. All CT scans at this facility are performed using dose optimization technique as  appropriate to a performed exam, to include automated exposure control,  adjustment of the mA and/or kV according to patient's size (including  appropriate matching for site-specific examinations), or use of iterative  reconstruction technique. FINDINGS:    2 small bulla at the left lung apex medially. The larger measures 2.7 x 2.1 x  3.8 cm. Axial image 11, coronal image 38. Subsegmental atelectasis at the left medial posterior lower lobe. Minimal  dependent atelectasis at the right base. Very small residual pneumothorax measuring less than 1 cm.   Left thoracostomy tube extends ventrally and superiorly to terminate at the  medial pleural space just anterior to the aortic arch. There is no evidence of mediastinal, hilar, nor axillary adenopathy. Evaluation of the mediastinum and karmen is limited by the lack of IV contrast.  The great vessels and thoracic aorta are unremarkable. There are no pleural effusions. Small amount of subcutaneous emphysema along the left chest wall. The included portion of the of the liver is unremarkable. The adrenal glands are normal.    The chest wall soft tissues are unremarkable. The bony structures are unremarkable. Impression  2 small left apical medial bulla/bleb. Subcentimeter residual left pneumothorax. Left chest tube in position. Minimal medial left lower lobe and right posterior costophrenic gutter  subsegmental atelectasis. EKG No results found for this or any previous visit. ECHO No results found for this or any previous visit. PFT No flowsheet data found.      Other ASA reactivity:   Pre-albumin:   Ionized Calcium:   NH4:   T3, FT4:  Cortisol:  Urine Osm:  Urine Lytes:   HbA1c:      Recent Results (from the past 24 hour(s))   METABOLIC PANEL, BASIC    Collection Time: 10/24/22  4:36 AM   Result Value Ref Range    Sodium 138 136 - 145 mmol/L    Potassium 3.8 3.5 - 5.5 mmol/L    Chloride 104 100 - 111 mmol/L    CO2 29 21 - 32 mmol/L    Anion gap 5 3.0 - 18 mmol/L    Glucose 79 74 - 99 mg/dL    BUN 8 7.0 - 18 MG/DL    Creatinine 0.92 0.6 - 1.3 MG/DL    BUN/Creatinine ratio 9 (L) 12 - 20      eGFR >60 >60 ml/min/1.73m2    Calcium 9.1 8.5 - 10.1 MG/DL   MAGNESIUM    Collection Time: 10/24/22  4:36 AM   Result Value Ref Range    Magnesium 1.8 1.6 - 2.6 mg/dL   CBC W/O DIFF    Collection Time: 10/24/22  4:36 AM   Result Value Ref Range    WBC 10.3 4.6 - 13.2 K/uL    RBC 6.33 (H) 4.35 - 5.65 M/uL    HGB 14.5 13.0 - 16.0 g/dL    HCT 45.6 36.0 - 48.0 %    MCV 72.0 (L) 78.0 - 100.0 FL    MCH 22.9 (L) 24.0 - 34.0 PG    MCHC 31.8 31.0 - 37.0 g/dL    RDW 16.2 (H) 11.6 - 14.5 % PLATELET 990 820 - 300 K/uL    MPV 10.6 9.2 - 11.8 FL    NRBC 0.0 0  WBC    ABSOLUTE NRBC 0.00 0.00 - 0.01 K/uL         Telemetry:normal sinus rhythm    The patient is: [x] acutely ill Risk of deterioration: [x] moderate    [] critically ill  [] high     [x]See my orders for details    My assessment, plan of care, findings, medications, side effects etc were discussed with:  [] Nurse [] PT/OT    [] Respiratory therapy [x] Dr. Aydee Cespedes   [] Family: answered all questions to satisfaction [x] Patient: answered all questions to satisfaction   [] Pharmacist []      [x] Total critical care time exclusive of procedures 30 minutes with complex decision making, coordination of care and counseling patient performed and > 50% time spent in face to face evaluation.       Mindy Olivares MD  10/24/2022

## 2022-10-24 NOTE — PROGRESS NOTES
conducted an initial consultation and Spiritual Assessment for Wild Villarreal, who is a 33 y.o.,male. Patients Primary Language is: [place]. According to the patients EMR [facility] Affiliation is: No Methodist. The reason the Patient came to the hospital is:   Patient Active Problem List    Diagnosis Date Noted    Pneumothorax 10/22/2022    Chest pain 10/22/2022        The  provided the following Interventions:  Initiated a relationship of care and support with patient in room 2306 this morning. Listened empathically to his story of being here  and his hopes for a quick release. There is no advance directive present. Provided chaplaincy education. Provided information about Spiritual Care Services. Offered prayer and assurance of continued prayers on patients behalf. The following outcomes were achieved:  Patient shared limited information about his medical narrative and spiritual journey/beliefs. Patient processed feeling about current hospitalization. Patient expressed gratitude for pastoral care visit. Assessment:  Patient does not have any Rastafari/cultural needs that will affect patients preferences in health care. There are no further spiritual or Rastafari issues which require Spiritual Care Services interventions at this time. Plan:  Chaplains will continue to follow and will provide pastoral care on an as needed/requested basis    . Rachel Bess   Spiritual Care   [phone]

## 2022-10-25 ENCOUNTER — ANESTHESIA EVENT (OUTPATIENT)
Dept: CARDIOTHORACIC SURGERY | Age: 27
DRG: 121 | End: 2022-10-25
Payer: MEDICAID

## 2022-10-25 ENCOUNTER — APPOINTMENT (OUTPATIENT)
Dept: GENERAL RADIOLOGY | Age: 27
DRG: 121 | End: 2022-10-25
Attending: INTERNAL MEDICINE
Payer: MEDICAID

## 2022-10-25 LAB
ANION GAP SERPL CALC-SCNC: 7 MMOL/L (ref 3–18)
APPEARANCE UR: CLEAR
APTT PPP: 30.5 SEC (ref 23–36.4)
BILIRUB UR QL: NEGATIVE
BUN SERPL-MCNC: 9 MG/DL (ref 7–18)
BUN/CREAT SERPL: 9 (ref 12–20)
CALCIUM SERPL-MCNC: 9.6 MG/DL (ref 8.5–10.1)
CHLORIDE SERPL-SCNC: 103 MMOL/L (ref 100–111)
CO2 SERPL-SCNC: 29 MMOL/L (ref 21–32)
COLOR UR: YELLOW
COVID-19 RAPID TEST, COVR: NOT DETECTED
CREAT SERPL-MCNC: 0.96 MG/DL (ref 0.6–1.3)
ERYTHROCYTE [DISTWIDTH] IN BLOOD BY AUTOMATED COUNT: 16 % (ref 11.6–14.5)
GLUCOSE SERPL-MCNC: 88 MG/DL (ref 74–99)
GLUCOSE UR STRIP.AUTO-MCNC: NEGATIVE MG/DL
HCT VFR BLD AUTO: 45.3 % (ref 36–48)
HGB BLD-MCNC: 14.7 G/DL (ref 13–16)
HGB UR QL STRIP: NEGATIVE
HISTORY CHECKED?,CKHIST: NORMAL
INR PPP: 1 (ref 0.8–1.2)
KETONES UR QL STRIP.AUTO: NEGATIVE MG/DL
LEUKOCYTE ESTERASE UR QL STRIP.AUTO: NEGATIVE
MAGNESIUM SERPL-MCNC: 2 MG/DL (ref 1.6–2.6)
MCH RBC QN AUTO: 23.2 PG (ref 24–34)
MCHC RBC AUTO-ENTMCNC: 32.5 G/DL (ref 31–37)
MCV RBC AUTO: 71.5 FL (ref 78–100)
NITRITE UR QL STRIP.AUTO: NEGATIVE
NRBC # BLD: 0 K/UL (ref 0–0.01)
NRBC BLD-RTO: 0 PER 100 WBC
PH UR STRIP: 8 [PH] (ref 5–8)
PLATELET # BLD AUTO: 292 K/UL (ref 135–420)
PMV BLD AUTO: 10.8 FL (ref 9.2–11.8)
POTASSIUM SERPL-SCNC: 3.8 MMOL/L (ref 3.5–5.5)
PROT UR STRIP-MCNC: NEGATIVE MG/DL
PROTHROMBIN TIME: 13.1 SEC (ref 11.5–15.2)
RBC # BLD AUTO: 6.34 M/UL (ref 4.35–5.65)
SODIUM SERPL-SCNC: 139 MMOL/L (ref 136–145)
SOURCE, COVRS: NORMAL
SP GR UR REFRACTOMETRY: 1.01 (ref 1–1.03)
UROBILINOGEN UR QL STRIP.AUTO: 1 EU/DL (ref 0.2–1)
WBC # BLD AUTO: 10.3 K/UL (ref 4.6–13.2)

## 2022-10-25 PROCEDURE — 74011000250 HC RX REV CODE- 250: Performed by: INTERNAL MEDICINE

## 2022-10-25 PROCEDURE — 74011250637 HC RX REV CODE- 250/637: Performed by: INTERNAL MEDICINE

## 2022-10-25 PROCEDURE — 80048 BASIC METABOLIC PNL TOTAL CA: CPT

## 2022-10-25 PROCEDURE — 85027 COMPLETE CBC AUTOMATED: CPT

## 2022-10-25 PROCEDURE — 65660000004 HC RM CVT STEPDOWN

## 2022-10-25 PROCEDURE — 36415 COLL VENOUS BLD VENIPUNCTURE: CPT

## 2022-10-25 PROCEDURE — 74011250636 HC RX REV CODE- 250/636: Performed by: PHYSICIAN ASSISTANT

## 2022-10-25 PROCEDURE — 71045 X-RAY EXAM CHEST 1 VIEW: CPT

## 2022-10-25 PROCEDURE — 81003 URINALYSIS AUTO W/O SCOPE: CPT

## 2022-10-25 PROCEDURE — 85730 THROMBOPLASTIN TIME PARTIAL: CPT

## 2022-10-25 PROCEDURE — 99223 1ST HOSP IP/OBS HIGH 75: CPT | Performed by: PHYSICIAN ASSISTANT

## 2022-10-25 PROCEDURE — 74011250636 HC RX REV CODE- 250/636: Performed by: INTERNAL MEDICINE

## 2022-10-25 PROCEDURE — 85610 PROTHROMBIN TIME: CPT

## 2022-10-25 PROCEDURE — 86923 COMPATIBILITY TEST ELECTRIC: CPT

## 2022-10-25 PROCEDURE — 87635 SARS-COV-2 COVID-19 AMP PRB: CPT

## 2022-10-25 PROCEDURE — 83735 ASSAY OF MAGNESIUM: CPT

## 2022-10-25 PROCEDURE — 99232 SBSQ HOSP IP/OBS MODERATE 35: CPT | Performed by: INTERNAL MEDICINE

## 2022-10-25 PROCEDURE — 2709999900 HC NON-CHARGEABLE SUPPLY

## 2022-10-25 PROCEDURE — 86900 BLOOD TYPING SEROLOGIC ABO: CPT

## 2022-10-25 RX ORDER — SODIUM CHLORIDE, SODIUM LACTATE, POTASSIUM CHLORIDE, CALCIUM CHLORIDE 600; 310; 30; 20 MG/100ML; MG/100ML; MG/100ML; MG/100ML
75 INJECTION, SOLUTION INTRAVENOUS CONTINUOUS
Status: DISCONTINUED | OUTPATIENT
Start: 2022-10-26 | End: 2022-10-27

## 2022-10-25 RX ORDER — SODIUM CHLORIDE 9 MG/ML
250 INJECTION, SOLUTION INTRAVENOUS AS NEEDED
Status: DISCONTINUED | OUTPATIENT
Start: 2022-10-25 | End: 2022-10-28 | Stop reason: HOSPADM

## 2022-10-25 RX ADMIN — OXYCODONE HYDROCHLORIDE 10 MG: 5 TABLET ORAL at 23:43

## 2022-10-25 RX ADMIN — FAMOTIDINE 20 MG: 20 TABLET ORAL at 18:15

## 2022-10-25 RX ADMIN — SODIUM CHLORIDE, POTASSIUM CHLORIDE, SODIUM LACTATE AND CALCIUM CHLORIDE 75 ML/HR: 600; 310; 30; 20 INJECTION, SOLUTION INTRAVENOUS at 23:44

## 2022-10-25 RX ADMIN — SODIUM CHLORIDE, PRESERVATIVE FREE 10 ML: 5 INJECTION INTRAVENOUS at 21:23

## 2022-10-25 RX ADMIN — MORPHINE SULFATE 4 MG: 4 INJECTION INTRAVENOUS at 20:29

## 2022-10-25 RX ADMIN — SODIUM CHLORIDE, PRESERVATIVE FREE 10 ML: 5 INJECTION INTRAVENOUS at 20:29

## 2022-10-25 RX ADMIN — SODIUM CHLORIDE, PRESERVATIVE FREE 10 ML: 5 INJECTION INTRAVENOUS at 05:10

## 2022-10-25 RX ADMIN — ACETAMINOPHEN 1000 MG: 500 TABLET ORAL at 21:23

## 2022-10-25 RX ADMIN — ACETAMINOPHEN 1000 MG: 500 TABLET ORAL at 05:10

## 2022-10-25 RX ADMIN — ACETAMINOPHEN 1000 MG: 500 TABLET ORAL at 12:17

## 2022-10-25 RX ADMIN — SODIUM CHLORIDE, PRESERVATIVE FREE 10 ML: 5 INJECTION INTRAVENOUS at 16:12

## 2022-10-25 RX ADMIN — OXYCODONE HYDROCHLORIDE 10 MG: 5 TABLET ORAL at 09:19

## 2022-10-25 RX ADMIN — OXYCODONE HYDROCHLORIDE 10 MG: 5 TABLET ORAL at 14:31

## 2022-10-25 RX ADMIN — MORPHINE SULFATE 4 MG: 4 INJECTION INTRAVENOUS at 16:03

## 2022-10-25 RX ADMIN — OXYCODONE HYDROCHLORIDE 10 MG: 5 TABLET ORAL at 04:15

## 2022-10-25 NOTE — PROGRESS NOTES
Problem: Falls - Risk of  Goal: *Absence of Falls  Description: Document Gabrielle Canas Fall Risk and appropriate interventions in the flowsheet.   Outcome: Progressing Towards Goal  Note: Fall Risk Interventions:  Mobility Interventions: Bed/chair exit alarm, Communicate number of staff needed for ambulation/transfer, Patient to call before getting OOB         Medication Interventions: Bed/chair exit alarm, Evaluate medications/consider consulting pharmacy, Patient to call before getting OOB, Teach patient to arise slowly    Elimination Interventions: Bed/chair exit alarm, Call light in reach, Patient to call for help with toileting needs, Stay With Me (per policy), Urinal in reach, Toileting schedule/hourly rounds    History of Falls Interventions: Bed/chair exit alarm, Door open when patient unattended, Room close to nurse's station         Problem: Patient Education: Go to Patient Education Activity  Goal: Patient/Family Education  Outcome: Progressing Towards Goal     Problem: Pain  Goal: *Control of Pain  Outcome: Progressing Towards Goal     Problem: Tissue Perfusion - Cardiopulmonary, Altered  Goal: *Optimize tissue perfusion  Outcome: Progressing Towards Goal  Goal: *Absence of hypoxia  Outcome: Progressing Towards Goal

## 2022-10-25 NOTE — H&P (VIEW-ONLY)
[facility]  -  Consult      10/25/2022        Leonor Joiner is a 33 y.o. male who is being seen in consult for pneumothorax, at  Dr. Isabella Perera request.      Assessment:     First time secondary spontaneous pneumothorax in the setting of left apical bleb disease  Active cigarette and marijuana smoker  Not COVID vaccinated      Plan:     1. Extensive chest tube tape and bandage removed. Chest tube cut to fit 5 and 1 connector. All sites secured and occlusive dressing placed. Treatment options for first occurrence of spontaneous pneumothorax discussed. Options of tube thoracostomy until the leak ceases and surgical blebectomy via VATS or minithoracotomy discussed in detail and expected outcome reviewed. Patient is very agreeable to pursue surgical treatment. Dr. Coley Pi to see and discuss further. Will place on suction, 30 in the interim. Tentatively placed on the OR schedule for tomorrow at 8:00. Complex decision making used for acute and/or chronic illness with multiple treatment options and risks defined. He will need the additional preoperative tests, which I will order:  Type and cross for 2 units PRBC  Coagulation profile / INR  Urinalysis  COVID rule out    Subjective:     CHIEF COMPLAINT: Chest pain and shortness of breath    History of Present Illness:     Previously healthy 33year-old active smoker who woke 3 days ago with shortness of breath and left-sided chest pain. He denied previous strenuous activity or coughing episode. He denies previous pneumothoraces, procedures or surgeries on his chest or any pre-existing health conditions. Evaluation in the emergency room showed he had a large left pneumothorax requiring a 24 Cambodian chest tube. Subsequent CT scan showed 2 cm x 3 cm left apical bleb. We are consulted today due to continued air leak and failed waterseal trial today.   He is currently resting comfortably without complaints of shortness of breath but with pain at his chest tube site. He has an intermittent provocative airleak on suction, 20    Past Medical History:   Denies    Past Surgical History:   None  Social History:   Single. No children. 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 No regular alcohol use. He does vape and use marijuana but denies amphetamines, cocaine or IV drug use    Family History:   Family history reviewed and does not include a first or second degree relative with heart disease or spontaneous pneumothoraces.   Dad had pancreatic cancer, mom had skin cancer  Family History   Problem Relation Age of Onset    Cancer Father        Allergies and Intolerances:   No Known Allergies    Home Medications:     None       Current Facility-Administered Medications   Medication Dose Route Frequency Provider Last Rate Last Admin    lidocaine 4 % patch 2 Patch  2 Patch TransDERmal Q24H Iris Gomez MD   2 Patch at 10/24/22 1813    sodium chloride (NS) flush 5-40 mL  5-40 mL IntraVENous Q8H Toño Scott MD   10 mL at 10/25/22 0510    sodium chloride (NS) flush 5-40 mL  5-40 mL IntraVENous PRN Toño Scott MD   10 mL at 10/22/22 2040    acetaminophen (TYLENOL) tablet 650 mg  650 mg Oral Q6H PRN Toño Scott MD        Or    acetaminophen (TYLENOL) suppository 650 mg  650 mg Rectal Q6H PRN Toño Scott MD        polyethylene glycol (MIRALAX) packet 17 g  17 g Oral DAILY PRN Toño Scott MD        promethazine (PHENERGAN) tablet 12.5 mg  12.5 mg Oral Q6H PRN Toño Scott MD        Or    ondansetron [facility]) injection 4 mg  4 mg IntraVENous Q6H PRN Toño Scott MD        famotidine (PEPCID) tablet 20 mg  20 mg Oral QPM Toño Scott MD   20 mg at 10/24/22 1813    albuterol-ipratropium (DUO-NEB) 2.5 MG-0.5 MG/3 ML  3 mL Nebulization Q6H PRN Toño Scott MD        acetaminophen (TYLENOL) tablet 1,000 mg  1,000 mg Oral Q8H Toño Scott MD   1,000 mg at 10/25/22 1217    oxyCODONE IR (ROXICODONE) tablet 5-10 mg  5-10 mg Oral Q4H PRN Conner Su MD   10 mg at 10/25/22 0919    morphine injection 4 mg  4 mg IntraVENous Q4H PRN Conner Su MD   4 mg at 10/23/22 1438   . Review of Systems:   As in  HPI including and:    Constitutional: No fever, unintentional weight loss or weight gain. Skin: No rashes, petechiae or easy bruising. HENT: No headache, hearing loss or nosebleeds. No loose or infected teeth. Eyes: No visual field cuts or double vision. no glasses. No drainage from eyes. Respiratory: No frequent respiratory infections or pneumonia. No history of difficult intubation. No history of wheezing. No hemoptysis. Cardiovascular: . No history of bulging veins, clots in the veins or infection of veins. Gastrointestinal: No constipation or diarrhea. No dark or bloody stools. No frequent nausea or vomiting. Genitourinary: No difficulty with urination. No urgency or frequency. No blood in urine. Musculoskeletal: No muscle or joint pain. No difficulty walking or climbing. Endo/Heme/Allergy: no diabetes. no skin rashes. No easy or severe bleeding history. Neurological: No loss of consciousness, one sided weakness, difficulty with speech or eating or drinking. No foot drop or difficulty with walking. All other systems reviewed and negative. Physical Examination:   Visit Vitals  /79   Pulse 66   Temp 97.6 °F (36.4 °C)   Resp 18   Ht 5' 11\" (1.803 m)   Wt 80 kg (176 lb 5.9 oz)   SpO2 100%   BMI 24.60 kg/m²       General: Well appearing, NAD   Psych:  Good eye contact and flow of speech and content   Eyes  PEARLA, anicteric   Ears/nose/mouth/throat:  MMM, tongue is midline. Good dentition. Respiratory  Clear to auscultation without wheezes, rales or rhonchi. Left chest tube site clean and dry. No subcutaneous air. Cardiovascular:  Regular rate and rhythm without murmur or rub. 2+ distal pulses. Negative bruit.   NO Varicose veins or evidence of chronic venous skin changes. Abdominal:  Not distended. Soft and non tender without HSM. Musculoskeletal  warm and well perfused. No clubbing,  cyanosis or edema. Neurological:  VILLALOBOS x 4 strong and equal.  No motor or sensory loss noted     Laboratory Data:     Lab Results   Component Value Date/Time    WBC 10.3 10/25/2022 04:20 AM    HGB 14.7 10/25/2022 04:20 AM    HCT 45.3 10/25/2022 04:20 AM    PLATELET 559 32/93/4479 04:20 AM     Lab Results   Component Value Date/Time    Sodium 139 10/25/2022 04:20 AM    Potassium 3.8 10/25/2022 04:20 AM    Chloride 103 10/25/2022 04:20 AM    CO2 29 10/25/2022 04:20 AM    Glucose 88 10/25/2022 04:20 AM    BUN 9 10/25/2022 04:20 AM    Creatinine 0.96 10/25/2022 04:20 AM     Recent Labs     10/25/22  0420 10/24/22  0436 10/23/22  0525   CA 9.6 9.1 9.3     EKG:   EKG Results       Procedure 720 Value Units Date/Time    EKG, 12 LEAD, INITIAL [[id number]] Collected: 10/22/22 0850    Order Status: Completed Updated: 10/22/22 1200     Ventricular Rate 100 BPM      Atrial Rate 100 BPM      P-R Interval 120 ms      QRS Duration 82 ms      Q-T Interval 336 ms      QTC Calculation (Bezet) 433 ms      Calculated P Axis 81 degrees      Calculated R Axis 101 degrees      Calculated T Axis 71 degrees      Diagnosis --     Normal sinus rhythm  Right atrial enlargement  Anterolateral infarct , age undetermined  Abnormal ECG  No previous ECGs available  Confirmed by Kahlil Banks MD, Sabrina Ramirez ([id number]) on 10/22/2022 12:00:40 PM              RADIOLOGY DATA: (images independently reviewed)    CT scan chest and serial chest x-rays revealing large left pneumothorax decompressed with chest tube.   CT scan revealing 2 x 3 cm left apical bleb    XR Results (most recent):  Results from Hospital Encounter encounter on 10/22/22    XR CHEST PORT    Narrative  EXAM:  XR CHEST PORT    INDICATION:   left sided pneumothorax s/p chest tube placement yesterday - CXR  for daily monitoring. COMPARISON: 10/24/2022. FINDINGS:  Stable appearance of the left chest tube tip overlying the upper mediastinum. Interval increase of left pneumothorax which extends from the apex to base. No  shift of midline structures. No consolidation or pleural effusion. Intact  osseous structures. Impression  Left chest tube with interval increase of moderate left pneumothorax. Discussed with patient's nurse Gilson Carrion at time of dictation. Neida Ervin PA-C    PLEASE NOTE:  This document has been produced using voice recognition software. Unrecognized errors in transcription may be present. NOTE TO PATIENT:  The purpose of this note is to communicate optimally with the other providers involved in your care. It is written using standard medical terminology. If you have questions regarding details of the note please call my office at [phone] and make an appointment to discuss your concerns.

## 2022-10-25 NOTE — PROGRESS NOTES
[facility]  Progress Note    Patient: Norman Agustin Age: 33 y.o. : 1995 MR#: [id number] SSN: [id number]  Date/Time: 10/25/2022     C/C: Shortness of breath      Subjective:   HPI : Patient with history of tobacco abuse marijuana use vaping, admitted with tension pneumothorax s/p chest tube          Review of Systems: Patient is alert awake oriented, no chest pain no shortness of breath no distress  positive responses in bold type   Constitutional: Negative for fever, chills, diaphoresis and unexpected weight change. HENT: Negative for ear pain, congestion, sore throat, rhinorrhea, drooling, trouble swallowing, neck pain and tinnitus. Eyes: Negative for photophobia, pain, redness and visual disturbance. Respiratory: negative for shortness of breath, cough, choking, chest tightness, wheezing or stridor. Cardiovascular: Negative for chest pain, palpitations and leg swelling. Gastrointestinal: Negative for nausea, vomiting, abdominal pain, diarrhea, constipation, blood in stool, abdominal distention and anal bleeding. Genitourinary: Negative for dysuria, urgency, frequency, hematuria, flank pain and difficulty urinating. Musculoskeletal: Negative for back pain and arthralgias. Skin: Negative for color change, rash and wound. Neurological: Negative for dizziness, seizures, syncope, speech difficulty, light-headedness or headaches. Hematological: Does not bruise/bleed easily. Psychiatric/Behavioral: Negative for suicidal ideas, hallucinations, behavioral problems, self-injury or agitation     Assessment/Plan:     1.   Acute shortness of breath  2 spontaneous pneumothorax left side  3 tobacco abuse-counseled regarding smoking cessation  4 UDS positive for iron deficiency    Plan    -Persistent air leak field waterseal    -Consulted CT surgery, case discussed with pulmonary-continue chest tube for now-possible thoracotomy and surgical correction of pneumothorax tomorrow tentatively    -Continue other management    Objective:       General:  Alert, cooperative, no acute distress   HEENT: No facial asymmetry, LEXUS Garth, External ears - WNL    Cardiovascular: S1S2 - regular , No Murmur   Pulmonary: Equal expansion , No Use of accessory muscles , No Rales No Rhonchi    GI:  +BS in all four quadrants, soft, non-tender  Extremities:  No edema; 2+ dorsalis pedis pulses bilaterally  Neuro: Alert and oriented X 2.        DVT Prophylaxis:  []Lovenox  []Hep SQ  []SCDs  []Coumadin   []On Heparin gtt    [] Eliquis [] Xarelto     Vitals:         VS: Visit Vitals  BP (!) 130/53   Pulse 63   Temp 98.2 °F (36.8 °C)   Resp 18   Ht 5' 11\" (1.803 m)   Wt 80 kg (176 lb 5.9 oz)   SpO2 100%   BMI 24.60 kg/m²      Tmax/24hrs: Temp (24hrs), Av.9 °F (36.6 °C), Min:97.4 °F (36.3 °C), Max:98.2 °F (36.8 °C)        Medications:   Current Facility-Administered Medications   Medication Dose Route Frequency    0.9% sodium chloride infusion 250 mL  250 mL IntraVENous PRN    [START ON 10/26/2022] lactated Ringers infusion  75 mL/hr IntraVENous CONTINUOUS    [START ON 10/26/2022] ceFAZolin (ANCEF) 2 g in sterile water (preservative free) 20 mL IV syringe  2 g IntraVENous ON CALL TO OR    lidocaine 4 % patch 2 Patch  2 Patch TransDERmal Q24H    sodium chloride (NS) flush 5-40 mL  5-40 mL IntraVENous Q8H    sodium chloride (NS) flush 5-40 mL  5-40 mL IntraVENous PRN    acetaminophen (TYLENOL) tablet 650 mg  650 mg Oral Q6H PRN    Or    acetaminophen (TYLENOL) suppository 650 mg  650 mg Rectal Q6H PRN    polyethylene glycol (MIRALAX) packet 17 g  17 g Oral DAILY PRN    promethazine (PHENERGAN) tablet 12.5 mg  12.5 mg Oral Q6H PRN    Or    ondansetron (ZOFRAN) injection 4 mg  4 mg IntraVENous Q6H PRN    famotidine (PEPCID) tablet 20 mg  20 mg Oral QPM    albuterol-ipratropium (DUO-NEB) 2.5 MG-0.5 MG/3 ML  3 mL Nebulization Q6H PRN    acetaminophen (TYLENOL) tablet 1,000 mg  1,000 mg Oral Q8H oxyCODONE IR (ROXICODONE) tablet 5-10 mg  5-10 mg Oral Q4H PRN    morphine injection 4 mg  4 mg IntraVENous Q4H PRN       Labs:    Recent Labs     10/25/22  0420 10/24/22  0436 10/23/22  0525   WBC 10.3 10.3 10.1   HGB 14.7 14.5 14.5   HCT 45.3 45.6 45.3    266 256     Recent Labs     10/25/22  1309 10/25/22  0420 10/24/22  0436 10/23/22  0525   NA  --  139 138 137   K  --  3.8 3.8 4.2   CL  --  103 104 105   CO2  --  29 29 30   GLU  --  88 79 109*   BUN  --  9 8 6*   CREA  --  0.96 0.92 1.06   CA  --  9.6 9.1 9.3   MG  --  2.0 1.8 1.8   INR 1.0  --   --   --          Time spent on direct patient care >30 mints     Complexity : High complex - due to multiple medical issues outlined above. CODE Status : Full code    Case discussed with:  [x]Patient  [] Family  []Nursing  []Case Management         Disclaimer: Sections of this note are dictated utilizing voice recognition software, which may have resulted in some phonetic based errors in grammar and contents. Even though attempts were made to correct all the mistakes, some may have been missed, and remained in the body of the document. If questions arise, please contact our department.     Signed By: Madhuri Cardona MD     October 25, 2022

## 2022-10-25 NOTE — PROGRESS NOTES
Problem: Falls - Risk of  Goal: *Absence of Falls  Description: Document Lizeth Nap Fall Risk and appropriate interventions in the flowsheet.   Outcome: Progressing Towards Goal  Note: Fall Risk Interventions:  Mobility Interventions: Bed/chair exit alarm, Patient to call before getting OOB         Medication Interventions: Evaluate medications/consider consulting pharmacy, Patient to call before getting OOB, Teach patient to arise slowly    Elimination Interventions: Bed/chair exit alarm, Call light in reach, Patient to call for help with toileting needs, Urinal in reach    History of Falls Interventions: Door open when patient unattended, Evaluate medications/consider consulting pharmacy         Problem: Pain  Goal: *Control of Pain  Outcome: Progressing Towards Goal

## 2022-10-25 NOTE — CONSULTS
[facility]  Pulmonary, Critical Care, and Sleep Medicine    Name: Ronel Babin MRN: [id number]   : 1995 Hospital: [facility]   Date: 10/25/2022        Pulmonary Follow-up In-Patient Consult                                              Consult requesting physician: Dr. Topher Hill  Reason for Consult: Spontaneous pneumothorax    IMPRESSION:   Spontaneous pneumothorax on left side now resolved s/p chest tube placement. Tobacco abuse  Microcytosis: thalassemia versus iron deficiency  THC + on drug screen        RECOMMENDATIONS:   Continue chest tube to suction and recommend consult thoracic surgery for consideration of VATs given persistent air leak  Daily CXR  Titrate O2 to maintain sats > 92%. Pain management per primary team  Nutrition: per primary team  Replace electrolytes  HOB >=30 degree, aggressive pulmonary toileting, incentive spirometry, PT/OT eval and treat  GI Prophylaxis: per primary team  not indicated  DVT Prophylaxis: SCDs recommended   Smoking and THC cessation counseling done by me and spent >11 minutes on it. Will follow  Case discussed with Dr. Shannan Gabriel     Subjective/History:     Ronel Babin is a 33 y.o. male with PMHx significant for tobacco use who had sudden onset this morning of left sided chest pain and dyspnea when we awoke from sleep. No previous ill contacts, other symptoms or trauma. Upon presentation to ED, left sided pneumothorax diagnosed and chest tube placed on left side without complication and noted to have full lung re-expansion. Currently has moderate left sided pain and has no dyspnea on 4 L/min of supplemental O2.      10/25/2022  Increased chest pain started this am; some dyspnea  CXR this am showed mild to moderate PTX on left following placing CT on water seal yesterday  Following application of suction, large air leak noted.   CT scan showed 2 left apical bulla; largest about 2 x 3 cm      Review of Systems:  A comprehensive review of systems was negative except for that written in the HPI. No Known Allergies     History reviewed. No pertinent past medical history. History reviewed. No pertinent surgical history. Family History   Problem Relation Age of Onset    Cancer Father         Social History     Tobacco Use    Smoking status: Every Day     Types: Cigarettes    Smokeless tobacco: Never   Substance Use Topics    Alcohol use: Never      Prior to Admission medications    Not on File       Current Facility-Administered Medications   Medication Dose Route Frequency    lidocaine 4 % patch 2 Patch  2 Patch TransDERmal Q24H    sodium chloride (NS) flush 5-40 mL  5-40 mL IntraVENous Q8H    sodium chloride (NS) flush 5-40 mL  5-40 mL IntraVENous PRN    acetaminophen (TYLENOL) tablet 650 mg  650 mg Oral Q6H PRN    Or    acetaminophen (TYLENOL) suppository 650 mg  650 mg Rectal Q6H PRN    polyethylene glycol (MIRALAX) packet 17 g  17 g Oral DAILY PRN    promethazine (PHENERGAN) tablet 12.5 mg  12.5 mg Oral Q6H PRN    Or    ondansetron (ZOFRAN) injection 4 mg  4 mg IntraVENous Q6H PRN    famotidine (PEPCID) tablet 20 mg  20 mg Oral QPM    albuterol-ipratropium (DUO-NEB) 2.5 MG-0.5 MG/3 ML  3 mL Nebulization Q6H PRN    acetaminophen (TYLENOL) tablet 1,000 mg  1,000 mg Oral Q8H    oxyCODONE IR (ROXICODONE) tablet 5-10 mg  5-10 mg Oral Q4H PRN    morphine injection 4 mg  4 mg IntraVENous Q4H PRN         Objective:   Vital Signs:    Visit Vitals  /65   Pulse 68   Temp 97.4 °F (36.3 °C)   Resp 18   Ht 5' 11\" (1.803 m)   Wt 80 kg (176 lb 5.9 oz)   SpO2 98%   BMI 24.60 kg/m²       O2 Device: None (Room air)   O2 Flow Rate (L/min): 0 l/min   Temp (24hrs), Av °F (36.7 °C), Min:97.4 °F (36.3 °C), Max:98.3 °F (36.8 °C)       Intake/Output:   Last shift:      No intake/output data recorded.   Last 3 shifts: 10/23 1901 - 10/25 0700  In: 960 [P.O.:960]  Out: 3870 [Urine:3850]    Intake/Output Summary (Last 24 hours) at 10/25/2022 0246  Last data filed at 10/25/2022 0415  Gross per 24 hour   Intake 840 ml   Output 2502 ml   Net -1662 ml         Physical Exam:     General:  Alert, Awake, NAD, cooperative, no distress, appears stated age. Head:   Normocephalic, without obvious abnormality, atraumatic. Eye:   Conjunctivae/corneas clear. PERRLA, no scleral icterus, no pallor, no cyanosis  Nose:   Nares normal. Septum midline. Mucosa normal without erythema/exudate. No drainage/discharge. No sinus tenderness. Throat:  Lips, mucosa, and tongue normal. Teeth and gums normal. No tonsillar enlargement, no erythema, no exudates, no oral thrush  Neck:   Supple, symmetric, thyroid: no enlargement/tenderness/nodule, no JVD, no carotid bruit, no lymphadenopathy. Trachea midline  Back & spine: Symmetric, no curvature. Chest wall: Chest tube in place left thorax  Lung:   Adequate air entry bilateral equal, no rales, no rhonchi, no wheezing. No dullness on percussion. Heart:   Regular rate & rhythm. S1 S2 present, no murmur, no gallop, no click, no rub  Abdomen:  Soft, NT, ND, +BS, no masses, no organomegaly  Extremities:  No pedal edema, no cyanosis, no clubbing  Pulses: 2+ and symmetric in DP  Lymphatic:  No cervical, supraclavicular and axillary palpable lymphadenopathy. Musculoskeletal: No joint swelling or tenderness. Neurologic:  Grossly non focal.        Data:         Chemistry Recent Labs     10/25/22  0420 10/24/22  0436 10/23/22  0525   GLU 88 79 109*    138 137   K 3.8 3.8 4.2    104 105   CO2 29 29 30   BUN 9 8 6*   CREA 0.96 0.92 1.06   CA 9.6 9.1 9.3   MG 2.0 1.8 1.8   AGAP 7 5 2*   BUCR 9* 9* 6*          Lactic Acid No results found for: LAC  No results for input(s): LAC in the last 72 hours.      Liver Enzymes Protein, total   Date Value Ref Range Status   10/22/2022 7.3 6.4 - 8.2 g/dL Final     Albumin   Date Value Ref Range Status   10/22/2022 4.1 3.4 - 5.0 g/dL Final     Globulin   Date Value Ref Range Status   10/22/2022 3.2 2.0 - 4.0 g/dL Final     A-G Ratio   Date Value Ref Range Status   10/22/2022 1.3 0.8 - 1.7   Final     Alk. phosphatase   Date Value Ref Range Status   10/22/2022 60 45 - 117 U/L Final     No results for input(s): TP, ALB, GLOB, AGRAT, AP, TBIL in the last 72 hours. No lab exists for component: SGOT, GPT, DBIL       CBC w/Diff Recent Labs     10/25/22  0420 10/24/22  0436 10/23/22  0525   WBC 10.3 10.3 10.1   RBC 6.34* 6.33* 6.21*   HGB 14.7 14.5 14.5   HCT 45.3 45.6 45.3    266 256          Cardiac Enzymes No results found for: CPK, CK, CKMMB, CKMB, RCK3, CKMBT, CKNDX, CKND1, RAMBO, TROPT, TROIQ, BINTA, TROPT, TNIPOC, BNP, BNPP     BNP No results found for: BNP, BNPP, XBNPT     Coagulation No results for input(s): PTP, INR, APTT, INREXT, INREXT in the last 72 hours. Thyroid  No results found for: T4, T3U, TSH, TSHEXT, TSHEXT       Lipid Panel No results found for: CHOL, CHOLPOCT, CHOLX, CHLST, CHOLV, 664103, HDL, HDLP, LDL, LDLC, DLDLP, 421570, VLDLC, VLDL, TGLX, TRIGL, TRIGP, TGLPOCT, CHHD, CHHDX     ABG No results for input(s): PHI, PHI, POC2, PCO2I, PO2, PO2I, HCO3, HCO3I, FIO2, FIO2I in the last 72 hours. Urinalysis No results found for: COLOR, APPRN, SPGRU, REFSG, SHAHNAZ, PROTU, GLUCU, KETU, BILU, UROU, JAMES, LEUKU, GLUKE, EPSU, BACTU, WBCU, RBCU, CASTS, UCRY     Micro  No results for input(s): SDES, CULT in the last 72 hours. No results for input(s): CULT in the last 72 hours. XR (Most Recent). CXR reviewed by me and compared with previous CXR Results from Hospital Encounter encounter on 10/22/22    XR CHEST PORT    Narrative  HISTORY: Chest tube follow-up. EXAM: Chest.    TECHNIQUE: Single view AP portable upright chest.    COMPARISON: 10/23/2022    FINDINGS: Unchanged position of left chest tube catheter. There is no  pneumothorax, pneumonia or pleural effusions. Heart and mediastinal structures  are unremarkable. . Visualized bony thorax and soft tissues are within normal  limits. Impression  IMPRESSION:    1. No acute cardiopulmonary process. Unchanged position of left chest tube  catheter. CT (Most Recent) Results from Hospital Encounter encounter on 10/22/22    CT CHEST WO CONT    Narrative  EXAM: CT CHEST WITHOUT CONTRAST. CLINICAL HISTORY/INDICATION:  32 yo man with no past medical history but is a  smoker had acute onset left sided pneumothorax now s/p chest tube. Please  evaluate with CT scan for any anatomic/parenchymal abnormality that may have  contributed to PTX.    COMPARISON: Chest x-ray 10/22, 10/23/2022. TECHNIQUE: Standard helical images were obtained from the thoracic inlet through  the adrenals at 5 mm thick sections without intravenous contrast.  Coronal and sagittal reformations obtained. Images were reviewed on both soft  tissue, lung, and bone window settings. All CT scans at this facility are performed using dose optimization technique as  appropriate to a performed exam, to include automated exposure control,  adjustment of the mA and/or kV according to patient's size (including  appropriate matching for site-specific examinations), or use of iterative  reconstruction technique. FINDINGS:    2 small bulla at the left lung apex medially. The larger measures 2.7 x 2.1 x  3.8 cm. Axial image 11, coronal image 38. Subsegmental atelectasis at the left medial posterior lower lobe. Minimal  dependent atelectasis at the right base. Very small residual pneumothorax measuring less than 1 cm. Left thoracostomy tube extends ventrally and superiorly to terminate at the  medial pleural space just anterior to the aortic arch. There is no evidence of mediastinal, hilar, nor axillary adenopathy. Evaluation of the mediastinum and karmen is limited by the lack of IV contrast.  The great vessels and thoracic aorta are unremarkable. There are no pleural effusions. Small amount of subcutaneous emphysema along the left chest wall.     The included portion of the of the liver is unremarkable. The adrenal glands are normal.    The chest wall soft tissues are unremarkable. The bony structures are unremarkable. Impression  2 small left apical medial bulla/bleb. Subcentimeter residual left pneumothorax. Left chest tube in position. Minimal medial left lower lobe and right posterior costophrenic gutter  subsegmental atelectasis. EKG No results found for this or any previous visit. ECHO No results found for this or any previous visit. PFT No flowsheet data found.      Other ASA reactivity:   Pre-albumin:   Ionized Calcium:   NH4:   T3, FT4:  Cortisol:  Urine Osm:  Urine Lytes:   HbA1c:      Recent Results (from the past 24 hour(s))   METABOLIC PANEL, BASIC    Collection Time: 10/25/22  4:20 AM   Result Value Ref Range    Sodium 139 136 - 145 mmol/L    Potassium 3.8 3.5 - 5.5 mmol/L    Chloride 103 100 - 111 mmol/L    CO2 29 21 - 32 mmol/L    Anion gap 7 3.0 - 18 mmol/L    Glucose 88 74 - 99 mg/dL    BUN 9 7.0 - 18 MG/DL    Creatinine 0.96 0.6 - 1.3 MG/DL    BUN/Creatinine ratio 9 (L) 12 - 20      eGFR >60 >60 ml/min/1.73m2    Calcium 9.6 8.5 - 10.1 MG/DL   MAGNESIUM    Collection Time: 10/25/22  4:20 AM   Result Value Ref Range    Magnesium 2.0 1.6 - 2.6 mg/dL   CBC W/O DIFF    Collection Time: 10/25/22  4:20 AM   Result Value Ref Range    WBC 10.3 4.6 - 13.2 K/uL    RBC 6.34 (H) 4.35 - 5.65 M/uL    HGB 14.7 13.0 - 16.0 g/dL    HCT 45.3 36.0 - 48.0 %    MCV 71.5 (L) 78.0 - 100.0 FL    MCH 23.2 (L) 24.0 - 34.0 PG    MCHC 32.5 31.0 - 37.0 g/dL    RDW 16.0 (H) 11.6 - 14.5 %    PLATELET 581 545 - 178 K/uL    MPV 10.8 9.2 - 11.8 FL    NRBC 0.0 0  WBC    ABSOLUTE NRBC 0.00 0.00 - 0.01 K/uL         Telemetry:normal sinus rhythm    The patient is: [x] acutely ill Risk of deterioration: [x] moderate    [] critically ill  [] high     [x]See my orders for details    My assessment, plan of care, findings, medications, side effects etc were discussed with:  [] Nurse [] PT/OT    [] Respiratory therapy [x] Dr. Chiki Austin   [] Family: answered all questions to satisfaction [x] Patient: answered all questions to satisfaction   [] Pharmacist []      [x] Total critical care time exclusive of procedures 30 minutes with complex decision making, coordination of care and counseling patient performed and > 50% time spent in face to face evaluation.       June Cortez MD  10/25/2022

## 2022-10-25 NOTE — PROGRESS NOTES
Bedside and Verbal shift change report given to Jorge Primes (oncoming nurse) by Remy Araujo RN (offgoing nurse). Report included the following information SBAR, Kardex, Intake/Output, MAR, and Cardiac Rhythm NSR with PVCs . Wound Prevention Checklist    Patient: Wild Villarreal (31 y.o. male)  Date: 10/25/2022  Diagnosis: Pneumothorax [J93.9] <principal problem not specified>    Precautions:         []  Heel prevention boots placed on patient    []  Patient turned q2h during shift    []  Lift team ordered    []  Patient on [place] bed/Specialty bed    [x]  Each Wound is documented during shift (Stage, Color, drainage, odor, measurements, and dressings)    [x]  Dual skin check done with Jaskaran Ramos RN           0394- Pulmonary doctor came out of patient room and told nurse he connected chest tube to suction. Also told nurse that patient was in pain and needed pain medication and possible oxygen to ease patient. This nurse went into room and patient was moaning in pain, saying he cannot get comfortable. Patient was given PRN oxy 10mg and put on 2L NC.    0950- Patient stated pain went down some, that pain is only in posterior now. Pillow placed under lumbar area of back to try to relieve pain per patient. 80- Radiologist called and notified this nurse that xray (completed this morning when patient was on water seal) show increasing pneumothorax and the chest tube needs adjustment. WIL Moul[place] on floor and notified him of radiology conversation. He said he just changed chest tube dressing and increased suction to 30.

## 2022-10-25 NOTE — CONSULTS
[facility]  -  Consult      10/25/2022        Brandie Sam is a 33 y.o. male who is being seen in consult for pneumothorax, at  Dr. Romano Nation request.      Assessment:     First time secondary spontaneous pneumothorax in the setting of left apical bleb disease  Active cigarette and marijuana smoker  Not COVID vaccinated      Plan:     1. Extensive chest tube tape and bandage removed. Chest tube cut to fit 5 and 1 connector. All sites secured and occlusive dressing placed. Treatment options for first occurrence of spontaneous pneumothorax discussed. Options of tube thoracostomy until the leak ceases and surgical blebectomy via VATS or minithoracotomy discussed in detail and expected outcome reviewed. Patient is very agreeable to pursue surgical treatment. Dr. Marshall Ready to see and discuss further. Will place on suction, 30 in the interim. Tentatively placed on the OR schedule for tomorrow at 8:00. Complex decision making used for acute and/or chronic illness with multiple treatment options and risks defined. He will need the additional preoperative tests, which I will order:  Type and cross for 2 units PRBC  Coagulation profile / INR  Urinalysis  COVID rule out    Subjective:     CHIEF COMPLAINT: Chest pain and shortness of breath    History of Present Illness:     Previously healthy 33year-old active smoker who woke 3 days ago with shortness of breath and left-sided chest pain. He denied previous strenuous activity or coughing episode. He denies previous pneumothoraces, procedures or surgeries on his chest or any pre-existing health conditions. Evaluation in the emergency room showed he had a large left pneumothorax requiring a 24 [place] chest tube. Subsequent CT scan showed 2 cm x 3 cm left apical bleb. We are consulted today due to continued air leak and failed waterseal trial today.   He is currently resting comfortably without complaints of shortness of breath but with pain at his chest tube site. He has an intermittent provocative airleak on suction, 20    Past Medical History:   Denies    Past Surgical History:   None  Social History:   Single. No children. 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 No regular alcohol use. He does vape and use marijuana but denies amphetamines, cocaine or IV drug use    Family History:   Family history reviewed and does not include a first or second degree relative with heart disease or spontaneous pneumothoraces.   Dad had pancreatic cancer, mom had skin cancer  Family History   Problem Relation Age of Onset    Cancer Father        Allergies and Intolerances:   No Known Allergies    Home Medications:     None       Current Facility-Administered Medications   Medication Dose Route Frequency Provider Last Rate Last Admin    lidocaine 4 % patch 2 Patch  2 Patch TransDERmal Q24H Ruben Kim MD   2 Patch at 10/24/22 1813    sodium chloride (NS) flush 5-40 mL  5-40 mL IntraVENous Q8H Mary Heard MD   10 mL at 10/25/22 0510    sodium chloride (NS) flush 5-40 mL  5-40 mL IntraVENous PRN Mary Heard MD   10 mL at 10/22/22 2040    acetaminophen (TYLENOL) tablet 650 mg  650 mg Oral Q6H PRN Mary Heard MD        Or    acetaminophen (TYLENOL) suppository 650 mg  650 mg Rectal Q6H PRN Mary Heard MD        polyethylene glycol (MIRALAX) packet 17 g  17 g Oral DAILY PRN Mary Heard MD        promethazine (PHENERGAN) tablet 12.5 mg  12.5 mg Oral Q6H PRN Mary Heard MD        Or    ondansetron [facility]) injection 4 mg  4 mg IntraVENous Q6H PRN Mary Heard MD        famotidine (PEPCID) tablet 20 mg  20 mg Oral QPM Mary Heard MD   20 mg at 10/24/22 1813    albuterol-ipratropium (DUO-NEB) 2.5 MG-0.5 MG/3 ML  3 mL Nebulization Q6H PRN Mary Heard MD        acetaminophen (TYLENOL) tablet 1,000 mg  1,000 mg Oral Q8H Mary Heard MD   1,000 mg at 10/25/22 1217    oxyCODONE IR (ROXICODONE) tablet 5-10 mg  5-10 mg Oral Q4H PRN Steffanie Pacheco MD   10 mg at 10/25/22 0919    morphine injection 4 mg  4 mg IntraVENous Q4H PRN Steffanie Pacheco MD   4 mg at 10/23/22 1438   . Review of Systems:   As in  HPI including and:    Constitutional: No fever, unintentional weight loss or weight gain. Skin: No rashes, petechiae or easy bruising. HENT: No headache, hearing loss or nosebleeds. No loose or infected teeth. Eyes: No visual field cuts or double vision. no glasses. No drainage from eyes. Respiratory: No frequent respiratory infections or pneumonia. No history of difficult intubation. No history of wheezing. No hemoptysis. Cardiovascular: . No history of bulging veins, clots in the veins or infection of veins. Gastrointestinal: No constipation or diarrhea. No dark or bloody stools. No frequent nausea or vomiting. Genitourinary: No difficulty with urination. No urgency or frequency. No blood in urine. Musculoskeletal: No muscle or joint pain. No difficulty walking or climbing. Endo/Heme/Allergy: no diabetes. no skin rashes. No easy or severe bleeding history. Neurological: No loss of consciousness, one sided weakness, difficulty with speech or eating or drinking. No foot drop or difficulty with walking. All other systems reviewed and negative. Physical Examination:   Visit Vitals  /79   Pulse 66   Temp 97.6 °F (36.4 °C)   Resp 18   Ht 5' 11\" (1.803 m)   Wt 80 kg (176 lb 5.9 oz)   SpO2 100%   BMI 24.60 kg/m²       General: Well appearing, NAD   Psych:  Good eye contact and flow of speech and content   Eyes  PEARLA, anicteric   Ears/nose/mouth/throat:  MMM, tongue is midline. Good dentition. Respiratory  Clear to auscultation without wheezes, rales or rhonchi. Left chest tube site clean and dry. No subcutaneous air. Cardiovascular:  Regular rate and rhythm without murmur or rub. 2+ distal pulses. Negative bruit.   NO Varicose veins or evidence of chronic venous skin changes. Abdominal:  Not distended. Soft and non tender without HSM. Musculoskeletal  warm and well perfused. No clubbing,  cyanosis or edema. Neurological:  VILLALOBOS x 4 strong and equal.  No motor or sensory loss noted     Laboratory Data:     Lab Results   Component Value Date/Time    WBC 10.3 10/25/2022 04:20 AM    HGB 14.7 10/25/2022 04:20 AM    HCT 45.3 10/25/2022 04:20 AM    PLATELET 258 00/88/8238 04:20 AM     Lab Results   Component Value Date/Time    Sodium 139 10/25/2022 04:20 AM    Potassium 3.8 10/25/2022 04:20 AM    Chloride 103 10/25/2022 04:20 AM    CO2 29 10/25/2022 04:20 AM    Glucose 88 10/25/2022 04:20 AM    BUN 9 10/25/2022 04:20 AM    Creatinine 0.96 10/25/2022 04:20 AM     Recent Labs     10/25/22  0420 10/24/22  0436 10/23/22  0525   CA 9.6 9.1 9.3     EKG:   EKG Results       Procedure 720 Value Units Date/Time    EKG, 12 LEAD, INITIAL [[id number]] Collected: 10/22/22 0850    Order Status: Completed Updated: 10/22/22 1200     Ventricular Rate 100 BPM      Atrial Rate 100 BPM      P-R Interval 120 ms      QRS Duration 82 ms      Q-T Interval 336 ms      QTC Calculation (Bezet) 433 ms      Calculated P Axis 81 degrees      Calculated R Axis 101 degrees      Calculated T Axis 71 degrees      Diagnosis --     Normal sinus rhythm  Right atrial enlargement  Anterolateral infarct , age undetermined  Abnormal ECG  No previous ECGs available  Confirmed by Paramjit Cruz MD, Hilda Feliz ([id number]) on 10/22/2022 12:00:40 PM              RADIOLOGY DATA: (images independently reviewed)    CT scan chest and serial chest x-rays revealing large left pneumothorax decompressed with chest tube.   CT scan revealing 2 x 3 cm left apical bleb    XR Results (most recent):  Results from Hospital Encounter encounter on 10/22/22    XR CHEST PORT    Narrative  EXAM:  XR CHEST PORT    INDICATION:   left sided pneumothorax s/p chest tube placement yesterday - CXR  for daily monitoring. COMPARISON: 10/24/2022. FINDINGS:  Stable appearance of the left chest tube tip overlying the upper mediastinum. Interval increase of left pneumothorax which extends from the apex to base. No  shift of midline structures. No consolidation or pleural effusion. Intact  osseous structures. Impression  Left chest tube with interval increase of moderate left pneumothorax. Discussed with patient's nurse Irvin Sender at time of dictation. Vicenta Conley PA-C    PLEASE NOTE:  This document has been produced using voice recognition software. Unrecognized errors in transcription may be present. NOTE TO PATIENT:  The purpose of this note is to communicate optimally with the other providers involved in your care. It is written using standard medical terminology. If you have questions regarding details of the note please call my office at [phone] and make an appointment to discuss your concerns.

## 2022-10-26 ENCOUNTER — APPOINTMENT (OUTPATIENT)
Dept: GENERAL RADIOLOGY | Age: 27
DRG: 121 | End: 2022-10-26
Attending: INTERNAL MEDICINE
Payer: MEDICAID

## 2022-10-26 ENCOUNTER — ANESTHESIA (OUTPATIENT)
Dept: CARDIOTHORACIC SURGERY | Age: 27
DRG: 121 | End: 2022-10-26
Payer: MEDICAID

## 2022-10-26 ENCOUNTER — APPOINTMENT (OUTPATIENT)
Dept: GENERAL RADIOLOGY | Age: 27
DRG: 121 | End: 2022-10-26
Attending: PHYSICIAN ASSISTANT
Payer: MEDICAID

## 2022-10-26 LAB
ERYTHROCYTE [DISTWIDTH] IN BLOOD BY AUTOMATED COUNT: 15.6 % (ref 11.6–14.5)
GLUCOSE BLD STRIP.AUTO-MCNC: 110 MG/DL (ref 70–110)
HCT VFR BLD AUTO: 47.7 % (ref 36–48)
HGB BLD-MCNC: 15.2 G/DL (ref 13–16)
MCH RBC QN AUTO: 23.6 PG (ref 24–34)
MCHC RBC AUTO-ENTMCNC: 31.9 G/DL (ref 31–37)
MCV RBC AUTO: 74 FL (ref 78–100)
NRBC # BLD: 0 K/UL (ref 0–0.01)
NRBC BLD-RTO: 0 PER 100 WBC
PLATELET # BLD AUTO: 328 K/UL (ref 135–420)
PMV BLD AUTO: 10.6 FL (ref 9.2–11.8)
RBC # BLD AUTO: 6.45 M/UL (ref 4.35–5.65)
WBC # BLD AUTO: 19.6 K/UL (ref 4.6–13.2)

## 2022-10-26 PROCEDURE — 65620000000 HC RM CCU GENERAL

## 2022-10-26 PROCEDURE — 82962 GLUCOSE BLOOD TEST: CPT

## 2022-10-26 PROCEDURE — 74011000258 HC RX REV CODE- 258: Performed by: ANESTHESIOLOGY

## 2022-10-26 PROCEDURE — 74011250636 HC RX REV CODE- 250/636: Performed by: INTERNAL MEDICINE

## 2022-10-26 PROCEDURE — 77030031139 HC SUT VCRL2 J&J -A: Performed by: THORACIC SURGERY (CARDIOTHORACIC VASCULAR SURGERY)

## 2022-10-26 PROCEDURE — 2709999900 HC NON-CHARGEABLE SUPPLY

## 2022-10-26 PROCEDURE — 77030009814 HC LDWRE ECG PHIL -B

## 2022-10-26 PROCEDURE — 77030016151 HC PROTCTR LNS DFOG COVD -B: Performed by: THORACIC SURGERY (CARDIOTHORACIC VASCULAR SURGERY)

## 2022-10-26 PROCEDURE — 74011000250 HC RX REV CODE- 250: Performed by: PHYSICIAN ASSISTANT

## 2022-10-26 PROCEDURE — 77030008756 HC TU IRR SUC STRY -B: Performed by: THORACIC SURGERY (CARDIOTHORACIC VASCULAR SURGERY)

## 2022-10-26 PROCEDURE — APPNB45 APP NON BILLABLE 31-45 MINUTES: Performed by: PHYSICIAN ASSISTANT

## 2022-10-26 PROCEDURE — 88307 TISSUE EXAM BY PATHOLOGIST: CPT

## 2022-10-26 PROCEDURE — 99232 SBSQ HOSP IP/OBS MODERATE 35: CPT | Performed by: INTERNAL MEDICINE

## 2022-10-26 PROCEDURE — C9290 INJ, BUPIVACAINE LIPOSOME: HCPCS | Performed by: THORACIC SURGERY (CARDIOTHORACIC VASCULAR SURGERY)

## 2022-10-26 PROCEDURE — 77030034628 HC LIGASURE LAP SEAL DIV MD COVD -F: Performed by: THORACIC SURGERY (CARDIOTHORACIC VASCULAR SURGERY)

## 2022-10-26 PROCEDURE — 77030040830 HC CATH URETH FOL MDII -A: Performed by: THORACIC SURGERY (CARDIOTHORACIC VASCULAR SURGERY)

## 2022-10-26 PROCEDURE — 76060000036 HC ANESTHESIA 2.5 TO 3 HR: Performed by: THORACIC SURGERY (CARDIOTHORACIC VASCULAR SURGERY)

## 2022-10-26 PROCEDURE — 71045 X-RAY EXAM CHEST 1 VIEW: CPT

## 2022-10-26 PROCEDURE — 77030009968 HC RELD STPLR ENDOSC J&J -D: Performed by: THORACIC SURGERY (CARDIOTHORACIC VASCULAR SURGERY)

## 2022-10-26 PROCEDURE — 74011250637 HC RX REV CODE- 250/637: Performed by: INTERNAL MEDICINE

## 2022-10-26 PROCEDURE — 77030018823 HC SLV COMPR VENO -B: Performed by: THORACIC SURGERY (CARDIOTHORACIC VASCULAR SURGERY)

## 2022-10-26 PROCEDURE — 32655 THORACOSCOPY RESECT BULLAE: CPT | Performed by: THORACIC SURGERY (CARDIOTHORACIC VASCULAR SURGERY)

## 2022-10-26 PROCEDURE — 0BBG4ZZ EXCISION OF LEFT UPPER LUNG LOBE, PERCUTANEOUS ENDOSCOPIC APPROACH: ICD-10-PCS | Performed by: THORACIC SURGERY (CARDIOTHORACIC VASCULAR SURGERY)

## 2022-10-26 PROCEDURE — 74011000250 HC RX REV CODE- 250: Performed by: ANESTHESIOLOGY

## 2022-10-26 PROCEDURE — 74011250637 HC RX REV CODE- 250/637: Performed by: PHYSICIAN ASSISTANT

## 2022-10-26 PROCEDURE — 36415 COLL VENOUS BLD VENIPUNCTURE: CPT

## 2022-10-26 PROCEDURE — 77030002996 HC SUT SLK J&J -A: Performed by: THORACIC SURGERY (CARDIOTHORACIC VASCULAR SURGERY)

## 2022-10-26 PROCEDURE — 77030011808 HC STPLR ENDOSCOPIC J&J -D: Performed by: THORACIC SURGERY (CARDIOTHORACIC VASCULAR SURGERY)

## 2022-10-26 PROCEDURE — 0B5P4ZZ DESTRUCTION OF LEFT PLEURA, PERCUTANEOUS ENDOSCOPIC APPROACH: ICD-10-PCS | Performed by: THORACIC SURGERY (CARDIOTHORACIC VASCULAR SURGERY)

## 2022-10-26 PROCEDURE — 77030002933 HC SUT MCRYL J&J -A: Performed by: THORACIC SURGERY (CARDIOTHORACIC VASCULAR SURGERY)

## 2022-10-26 PROCEDURE — 74011250636 HC RX REV CODE- 250/636: Performed by: PHYSICIAN ASSISTANT

## 2022-10-26 PROCEDURE — 2709999900 HC NON-CHARGEABLE SUPPLY: Performed by: THORACIC SURGERY (CARDIOTHORACIC VASCULAR SURGERY)

## 2022-10-26 PROCEDURE — 76010000207 HC CV SURG 2.5 TO 3 HR INTENSV-TIER 1: Performed by: THORACIC SURGERY (CARDIOTHORACIC VASCULAR SURGERY)

## 2022-10-26 PROCEDURE — 77030008608 HC TRCR ENDOSC SMTH AMR -B: Performed by: THORACIC SURGERY (CARDIOTHORACIC VASCULAR SURGERY)

## 2022-10-26 PROCEDURE — 74011250636 HC RX REV CODE- 250/636: Performed by: ANESTHESIOLOGY

## 2022-10-26 PROCEDURE — 0BBP4ZZ EXCISION OF LEFT PLEURA, PERCUTANEOUS ENDOSCOPIC APPROACH: ICD-10-PCS | Performed by: THORACIC SURGERY (CARDIOTHORACIC VASCULAR SURGERY)

## 2022-10-26 PROCEDURE — 85027 COMPLETE CBC AUTOMATED: CPT

## 2022-10-26 PROCEDURE — 74011250636 HC RX REV CODE- 250/636: Performed by: THORACIC SURGERY (CARDIOTHORACIC VASCULAR SURGERY)

## 2022-10-26 PROCEDURE — 74011000258 HC RX REV CODE- 258: Performed by: THORACIC SURGERY (CARDIOTHORACIC VASCULAR SURGERY)

## 2022-10-26 PROCEDURE — 74011000250 HC RX REV CODE- 250: Performed by: INTERNAL MEDICINE

## 2022-10-26 PROCEDURE — 3E0T3BZ INTRODUCTION OF ANESTHETIC AGENT INTO PERIPHERAL NERVES AND PLEXI, PERCUTANEOUS APPROACH: ICD-10-PCS | Performed by: THORACIC SURGERY (CARDIOTHORACIC VASCULAR SURGERY)

## 2022-10-26 PROCEDURE — 77030016154: Performed by: THORACIC SURGERY (CARDIOTHORACIC VASCULAR SURGERY)

## 2022-10-26 PROCEDURE — 99232 SBSQ HOSP IP/OBS MODERATE 35: CPT | Performed by: PHYSICIAN ASSISTANT

## 2022-10-26 PROCEDURE — 77030006984: Performed by: THORACIC SURGERY (CARDIOTHORACIC VASCULAR SURGERY)

## 2022-10-26 PROCEDURE — 77010033678 HC OXYGEN DAILY

## 2022-10-26 PROCEDURE — 77030012770 HC TRCR OPT FX AMR -B: Performed by: THORACIC SURGERY (CARDIOTHORACIC VASCULAR SURGERY)

## 2022-10-26 PROCEDURE — 94762 N-INVAS EAR/PLS OXIMTRY CONT: CPT

## 2022-10-26 PROCEDURE — 77030015766: Performed by: THORACIC SURGERY (CARDIOTHORACIC VASCULAR SURGERY)

## 2022-10-26 PROCEDURE — 77030034154 HC SHR COAG HARM ACE J&J -F: Performed by: THORACIC SURGERY (CARDIOTHORACIC VASCULAR SURGERY)

## 2022-10-26 PROCEDURE — 77030020829: Performed by: THORACIC SURGERY (CARDIOTHORACIC VASCULAR SURGERY)

## 2022-10-26 PROCEDURE — 00540 ANES THORACOTOMY PX NOS: CPT | Performed by: ANESTHESIOLOGY

## 2022-10-26 PROCEDURE — 77030011264 HC ELECTRD BLD EXT COVD -A: Performed by: THORACIC SURGERY (CARDIOTHORACIC VASCULAR SURGERY)

## 2022-10-26 PROCEDURE — 77030012390 HC DRN CHST BTL GTNG -B: Performed by: THORACIC SURGERY (CARDIOTHORACIC VASCULAR SURGERY)

## 2022-10-26 PROCEDURE — C1729 CATH, DRAINAGE: HCPCS | Performed by: THORACIC SURGERY (CARDIOTHORACIC VASCULAR SURGERY)

## 2022-10-26 PROCEDURE — 77030038692 HC WND DEB SYS IRMX -B: Performed by: THORACIC SURGERY (CARDIOTHORACIC VASCULAR SURGERY)

## 2022-10-26 RX ORDER — LIDOCAINE HYDROCHLORIDE 20 MG/ML
INJECTION, SOLUTION EPIDURAL; INFILTRATION; INTRACAUDAL; PERINEURAL AS NEEDED
Status: DISCONTINUED | OUTPATIENT
Start: 2022-10-26 | End: 2022-10-26 | Stop reason: HOSPADM

## 2022-10-26 RX ORDER — SUCCINYLCHOLINE CHLORIDE 20 MG/ML
INJECTION INTRAMUSCULAR; INTRAVENOUS AS NEEDED
Status: DISCONTINUED | OUTPATIENT
Start: 2022-10-26 | End: 2022-10-26 | Stop reason: HOSPADM

## 2022-10-26 RX ORDER — ONDANSETRON 2 MG/ML
4 INJECTION INTRAMUSCULAR; INTRAVENOUS
Status: DISCONTINUED | OUTPATIENT
Start: 2022-10-26 | End: 2022-10-28 | Stop reason: HOSPADM

## 2022-10-26 RX ORDER — SODIUM CHLORIDE 9 MG/ML
INJECTION, SOLUTION INTRAVENOUS
Status: COMPLETED | OUTPATIENT
Start: 2022-10-26 | End: 2022-10-26

## 2022-10-26 RX ORDER — OXYCODONE AND ACETAMINOPHEN 5; 325 MG/1; MG/1
1-2 TABLET ORAL
Status: DISCONTINUED | OUTPATIENT
Start: 2022-10-26 | End: 2022-10-28 | Stop reason: HOSPADM

## 2022-10-26 RX ORDER — ONDANSETRON 2 MG/ML
INJECTION INTRAMUSCULAR; INTRAVENOUS AS NEEDED
Status: DISCONTINUED | OUTPATIENT
Start: 2022-10-26 | End: 2022-10-26 | Stop reason: HOSPADM

## 2022-10-26 RX ORDER — PROPOFOL 10 MG/ML
INJECTION, EMULSION INTRAVENOUS AS NEEDED
Status: DISCONTINUED | OUTPATIENT
Start: 2022-10-26 | End: 2022-10-26 | Stop reason: HOSPADM

## 2022-10-26 RX ORDER — SODIUM CHLORIDE, SODIUM LACTATE, POTASSIUM CHLORIDE, CALCIUM CHLORIDE 600; 310; 30; 20 MG/100ML; MG/100ML; MG/100ML; MG/100ML
INJECTION, SOLUTION INTRAVENOUS
Status: DISCONTINUED | OUTPATIENT
Start: 2022-10-26 | End: 2022-10-26 | Stop reason: HOSPADM

## 2022-10-26 RX ORDER — SODIUM CHLORIDE 0.9 % (FLUSH) 0.9 %
5-40 SYRINGE (ML) INJECTION EVERY 8 HOURS
Status: DISCONTINUED | OUTPATIENT
Start: 2022-10-26 | End: 2022-10-28 | Stop reason: HOSPADM

## 2022-10-26 RX ORDER — NALOXONE HYDROCHLORIDE 0.4 MG/ML
0.4 INJECTION, SOLUTION INTRAMUSCULAR; INTRAVENOUS; SUBCUTANEOUS AS NEEDED
Status: DISCONTINUED | OUTPATIENT
Start: 2022-10-26 | End: 2022-10-28 | Stop reason: HOSPADM

## 2022-10-26 RX ORDER — NEOSTIGMINE METHYLSULFATE 1 MG/ML
INJECTION, SOLUTION INTRAVENOUS AS NEEDED
Status: DISCONTINUED | OUTPATIENT
Start: 2022-10-26 | End: 2022-10-26 | Stop reason: HOSPADM

## 2022-10-26 RX ORDER — MIDAZOLAM HYDROCHLORIDE 1 MG/ML
INJECTION, SOLUTION INTRAMUSCULAR; INTRAVENOUS AS NEEDED
Status: DISCONTINUED | OUTPATIENT
Start: 2022-10-26 | End: 2022-10-26 | Stop reason: HOSPADM

## 2022-10-26 RX ORDER — ROCURONIUM BROMIDE 10 MG/ML
INJECTION, SOLUTION INTRAVENOUS AS NEEDED
Status: DISCONTINUED | OUTPATIENT
Start: 2022-10-26 | End: 2022-10-26 | Stop reason: HOSPADM

## 2022-10-26 RX ORDER — FENTANYL CITRATE 50 UG/ML
INJECTION, SOLUTION INTRAMUSCULAR; INTRAVENOUS AS NEEDED
Status: DISCONTINUED | OUTPATIENT
Start: 2022-10-26 | End: 2022-10-26 | Stop reason: HOSPADM

## 2022-10-26 RX ORDER — SODIUM CHLORIDE 0.9 % (FLUSH) 0.9 %
5-40 SYRINGE (ML) INJECTION AS NEEDED
Status: DISCONTINUED | OUTPATIENT
Start: 2022-10-26 | End: 2022-10-28 | Stop reason: HOSPADM

## 2022-10-26 RX ORDER — DEXAMETHASONE SODIUM PHOSPHATE 4 MG/ML
INJECTION, SOLUTION INTRA-ARTICULAR; INTRALESIONAL; INTRAMUSCULAR; INTRAVENOUS; SOFT TISSUE AS NEEDED
Status: DISCONTINUED | OUTPATIENT
Start: 2022-10-26 | End: 2022-10-26 | Stop reason: HOSPADM

## 2022-10-26 RX ORDER — BISACODYL 5 MG
10 TABLET, DELAYED RELEASE (ENTERIC COATED) ORAL DAILY
Status: DISCONTINUED | OUTPATIENT
Start: 2022-10-26 | End: 2022-10-28 | Stop reason: HOSPADM

## 2022-10-26 RX ORDER — METOPROLOL TARTRATE 5 MG/5ML
INJECTION INTRAVENOUS AS NEEDED
Status: DISCONTINUED | OUTPATIENT
Start: 2022-10-26 | End: 2022-10-26 | Stop reason: HOSPADM

## 2022-10-26 RX ORDER — ACETAMINOPHEN 325 MG/1
650 TABLET ORAL
Status: DISCONTINUED | OUTPATIENT
Start: 2022-10-26 | End: 2022-10-28 | Stop reason: HOSPADM

## 2022-10-26 RX ORDER — MORPHINE SULFATE 4 MG/ML
4 INJECTION INTRAVENOUS
Status: DISCONTINUED | OUTPATIENT
Start: 2022-10-26 | End: 2022-10-28 | Stop reason: HOSPADM

## 2022-10-26 RX ORDER — HEPARIN SODIUM 5000 [USP'U]/ML
5000 INJECTION, SOLUTION INTRAVENOUS; SUBCUTANEOUS EVERY 12 HOURS
Status: DISCONTINUED | OUTPATIENT
Start: 2022-10-26 | End: 2022-10-28 | Stop reason: HOSPADM

## 2022-10-26 RX ADMIN — FENTANYL CITRATE 100 MCG: 50 INJECTION INTRAMUSCULAR; INTRAVENOUS at 08:27

## 2022-10-26 RX ADMIN — FENTANYL CITRATE 50 MCG: 50 INJECTION INTRAMUSCULAR; INTRAVENOUS at 09:19

## 2022-10-26 RX ADMIN — SODIUM CHLORIDE, SODIUM LACTATE, POTASSIUM CHLORIDE, AND CALCIUM CHLORIDE: 600; 310; 30; 20 INJECTION, SOLUTION INTRAVENOUS at 08:14

## 2022-10-26 RX ADMIN — HEPARIN SODIUM 5000 UNITS: 5000 INJECTION INTRAVENOUS; SUBCUTANEOUS at 22:48

## 2022-10-26 RX ADMIN — MORPHINE SULFATE 4 MG: 4 INJECTION INTRAVENOUS at 00:13

## 2022-10-26 RX ADMIN — SODIUM CHLORIDE, PRESERVATIVE FREE 10 ML: 5 INJECTION INTRAVENOUS at 22:52

## 2022-10-26 RX ADMIN — FAMOTIDINE 20 MG: 20 TABLET ORAL at 17:56

## 2022-10-26 RX ADMIN — ROCURONIUM BROMIDE 50 MG: 50 INJECTION INTRAVENOUS at 08:30

## 2022-10-26 RX ADMIN — DEXAMETHASONE SODIUM PHOSPHATE 4 MG: 4 INJECTION, SOLUTION INTRAMUSCULAR; INTRAVENOUS at 09:00

## 2022-10-26 RX ADMIN — SODIUM CHLORIDE, PRESERVATIVE FREE 10 ML: 5 INJECTION INTRAVENOUS at 21:59

## 2022-10-26 RX ADMIN — MORPHINE SULFATE 4 MG: 4 INJECTION INTRAVENOUS at 04:14

## 2022-10-26 RX ADMIN — ONDANSETRON 4 MG: 2 INJECTION INTRAMUSCULAR; INTRAVENOUS at 10:08

## 2022-10-26 RX ADMIN — MORPHINE SULFATE 4 MG: 4 INJECTION INTRAVENOUS at 15:45

## 2022-10-26 RX ADMIN — CEFAZOLIN 2 G: 1 INJECTION, POWDER, FOR SOLUTION INTRAMUSCULAR; INTRAVENOUS at 14:53

## 2022-10-26 RX ADMIN — PROPOFOL 200 MG: 10 INJECTION, EMULSION INTRAVENOUS at 08:27

## 2022-10-26 RX ADMIN — ROCURONIUM BROMIDE 10 MG: 50 INJECTION INTRAVENOUS at 09:42

## 2022-10-26 RX ADMIN — HEPARIN SODIUM 5000 UNITS: 5000 INJECTION INTRAVENOUS; SUBCUTANEOUS at 11:26

## 2022-10-26 RX ADMIN — SODIUM CHLORIDE, PRESERVATIVE FREE 10 ML: 5 INJECTION INTRAVENOUS at 14:10

## 2022-10-26 RX ADMIN — PHENYLEPHRINE HYDROCHLORIDE 100 MCG: 10 INJECTION INTRAVENOUS at 10:27

## 2022-10-26 RX ADMIN — SUCCINYLCHOLINE CHLORIDE 100 MG: 20 INJECTION, SOLUTION INTRAMUSCULAR; INTRAVENOUS at 08:27

## 2022-10-26 RX ADMIN — WATER 2 G: 1 INJECTION INTRAMUSCULAR; INTRAVENOUS; SUBCUTANEOUS at 08:51

## 2022-10-26 RX ADMIN — METOPROLOL TARTRATE 2 MG: 1 INJECTION, SOLUTION INTRAVENOUS at 09:27

## 2022-10-26 RX ADMIN — MORPHINE SULFATE 4 MG: 4 INJECTION INTRAVENOUS at 20:59

## 2022-10-26 RX ADMIN — PHENYLEPHRINE HYDROCHLORIDE 100 MCG: 10 INJECTION INTRAVENOUS at 08:57

## 2022-10-26 RX ADMIN — OXYCODONE HYDROCHLORIDE AND ACETAMINOPHEN 2 TABLET: 5; 325 TABLET ORAL at 13:42

## 2022-10-26 RX ADMIN — ONDANSETRON 4 MG: 2 INJECTION INTRAMUSCULAR; INTRAVENOUS at 11:26

## 2022-10-26 RX ADMIN — OXYCODONE HYDROCHLORIDE AND ACETAMINOPHEN 2 TABLET: 5; 325 TABLET ORAL at 17:56

## 2022-10-26 RX ADMIN — MIDAZOLAM HYDROCHLORIDE 2 MG: 2 INJECTION, SOLUTION INTRAMUSCULAR; INTRAVENOUS at 08:16

## 2022-10-26 RX ADMIN — MORPHINE SULFATE 4 MG: 4 INJECTION INTRAVENOUS at 11:26

## 2022-10-26 RX ADMIN — SODIUM CHLORIDE, PRESERVATIVE FREE 10 ML: 5 INJECTION INTRAVENOUS at 06:00

## 2022-10-26 RX ADMIN — SODIUM CHLORIDE, PRESERVATIVE FREE 10 ML: 5 INJECTION INTRAVENOUS at 04:15

## 2022-10-26 RX ADMIN — NEOSTIGMINE METHYLSULFATE 3 MG: 1 INJECTION, SOLUTION INTRAVENOUS at 10:33

## 2022-10-26 RX ADMIN — PHENYLEPHRINE HYDROCHLORIDE 100 MCG: 10 INJECTION INTRAVENOUS at 09:00

## 2022-10-26 RX ADMIN — FENTANYL CITRATE 100 MCG: 50 INJECTION INTRAMUSCULAR; INTRAVENOUS at 09:29

## 2022-10-26 RX ADMIN — SODIUM CHLORIDE, POTASSIUM CHLORIDE, SODIUM LACTATE AND CALCIUM CHLORIDE 75 ML/HR: 600; 310; 30; 20 INJECTION, SOLUTION INTRAVENOUS at 12:14

## 2022-10-26 RX ADMIN — PHENYLEPHRINE HYDROCHLORIDE 100 MCG: 10 INJECTION INTRAVENOUS at 10:18

## 2022-10-26 RX ADMIN — OXYCODONE HYDROCHLORIDE AND ACETAMINOPHEN 2 TABLET: 5; 325 TABLET ORAL at 22:45

## 2022-10-26 RX ADMIN — CEFAZOLIN 2 G: 1 INJECTION, POWDER, FOR SOLUTION INTRAMUSCULAR; INTRAVENOUS at 22:48

## 2022-10-26 RX ADMIN — LIDOCAINE HYDROCHLORIDE 40 MG: 20 INJECTION, SOLUTION EPIDURAL; INFILTRATION; INTRACAUDAL; PERINEURAL at 08:27

## 2022-10-26 NOTE — PROGRESS NOTES
Problem: Falls - Risk of  Goal: *Absence of Falls  Description: Document Viryolis Vega Fall Risk and appropriate interventions in the flowsheet.   10/26/2022 0048 by Sandy Her RN  Outcome: Progressing Towards Goal  Note: Fall Risk Interventions:  Mobility Interventions: Communicate number of staff needed for ambulation/transfer, Bed/chair exit alarm, Patient to call before getting OOB         Medication Interventions: Bed/chair exit alarm, Evaluate medications/consider consulting pharmacy, Patient to call before getting OOB    Elimination Interventions: Call light in reach, Patient to call for help with toileting needs, Urinal in reach    History of Falls Interventions: Room close to nurse's station      10/26/2022 0048 by Sandy Her RN  Note: Fall Risk Interventions:  Mobility Interventions: Communicate number of staff needed for ambulation/transfer, Bed/chair exit alarm, Patient to call before getting OOB         Medication Interventions: Bed/chair exit alarm, Evaluate medications/consider consulting pharmacy, Patient to call before getting OOB    Elimination Interventions: Call light in reach, Patient to call for help with toileting needs, Urinal in reach    History of Falls Interventions: Room close to nurse's station         Problem: Patient Education: Go to Patient Education Activity  Goal: Patient/Family Education  Outcome: Progressing Towards Goal     Problem: Pain  Goal: *Control of Pain  Outcome: Progressing Towards Goal     Problem: Tissue Perfusion - Cardiopulmonary, Altered  Goal: *Optimize tissue perfusion  Outcome: Progressing Towards Goal  Goal: *Absence of hypoxia  Outcome: Progressing Towards Goal

## 2022-10-26 NOTE — PROGRESS NOTES
TRANSFER - IN REPORT:    Verbal report received from Sade RN(name) on Marlon Almanzayle  being received from cvt stepdown(unit) for ordered procedure      Report consisted of patients Situation, Background, Assessment and   Recommendations(SBAR). Information from the following report(s) SBAR, Intake/Output, MAR, Recent Results, Pre Procedure Checklist, Procedure Verification, and Quality Measures was reviewed with the receiving nurse. Opportunity for questions and clarification was provided. Assessment completed upon patients arrival to unit and care assumed.         Sade

## 2022-10-26 NOTE — INTERVAL H&P NOTE
Update History & Physical    The Patient's History and Physical of October 25, 2022 was reviewed with the patient and I examined the patient. There was no change. The surgical site was confirmed by the patient and me. Plan:  The risk, benefits, expected outcome, and alternative to the recommended procedure have been discussed with the patient. Patient understands and wants to proceed with the procedure.     Electronically signed by Ella Benitez MD on 10/26/2022 at 8:13 AM

## 2022-10-26 NOTE — PROGRESS NOTES
TRANSFER - OUT REPORT:    Verbal report given to [facility] RN(name) on Elina Callaway  being transferred to CVT ICU(unit) for routine post - op       Report consisted of patients Situation, Background, Assessment and   Recommendations(SBAR). Information from the following report(s) SBAR, Procedure Summary, MAR, Recent Results, Procedure Verification, and Quality Measures was reviewed with the receiving nurse. Lines:   Peripheral IV 10/25/22 Anterior;Right Forearm (Active)   Site Assessment Clean, dry, & intact 10/25/22 2123   Phlebitis Assessment 0 10/25/22 2123   Infiltration Assessment 0 10/25/22 2123   Dressing Status Clean, dry, & intact 10/25/22 2123   Dressing Type Transparent;Tape 10/25/22 2123   Hub Color/Line Status Blue;Flushed;Patent; End cap changed 10/25/22 2123   Alcohol Cap Used Yes 10/25/22 2123       Peripheral IV 10/26/22 Posterior;Right Hand (Active)   Site Assessment Clean, dry, & intact 10/26/22 0440   Phlebitis Assessment 0 10/26/22 0440   Infiltration Assessment 0 10/26/22 0440   Dressing Status Clean, dry, & intact 10/26/22 0440   Dressing Type Transparent;Tape 10/26/22 0440   Hub Color/Line Status Pink;Flushed;Patent;Capped 10/26/22 0440   Alcohol Cap Used Yes 10/26/22 0440       Arterial Line 10/26/22 (Active)        Opportunity for questions and clarification was provided.       Patient transported with:   Monitor  Registered Nurse  Tech

## 2022-10-26 NOTE — PROGRESS NOTES
Bedside and Verbal shift change report given to enoch RN (oncoming nurse) by Halley Burton RN (offgoing nurse). Report included the following information SBAR.       2805 pt was taken off unit for blebectomy. Pt will return to CVT-ICU when procedure is finished.

## 2022-10-26 NOTE — PROGRESS NOTES
10:49  Pt arrived to CVT -ICU rm 2354 from OR. Pt with large incontinence of urine. Pt restless. 11:00  Pt rolled onto stomach. Assisted back to side-lying. Wiped with bath wipe and changed bed pads. Chest tube draining serousanguinous fluid, currerntly 45 ml. Chest tube to -20cm wall suction. No air leak noted. Left lateral incision dressing CDI. No crepitus noted. Sp02 94% on 6L oxygen mask. Pt removing mask to cough copious amounts of pink-tinged, thick secretions. 11:30  Pt with 2 episodes of emesis - medicated with 4 mg zofran, followed by 4 mg iv morphine for 10/10 pain. 12:00  Pt dozing peacefully. Sp02 94-96% on 4 liters humidified 02 via n/c.    13:45  Pt medicated with 10 mg oxycodone for 10/10 pain. 14:00  Pt assisted with urinal and voided 300 ml of clear, yellow urine. Drank 75% of clear liquid tray. 18:00  Pt tolerating clear liquid diet    19:00  Bedside and Verbal shift change report given to Shabnam Salcido RN (oncoming nurse) by Jennifer Dudley RN (offgoing nurse). Report included the following information SBAR, Procedure Summary, Intake/Output, MAR, Recent Results, and Cardiac Rhythm Sinus rhythm with frequent PVCs .

## 2022-10-26 NOTE — ANESTHESIA PREPROCEDURE EVALUATION
Relevant Problems   No relevant active problems       Anesthetic History   No history of anesthetic complications            Review of Systems / Medical History  Patient summary reviewed and pertinent labs reviewed    Pulmonary          Smoker      Comments: pneumothorax   Neuro/Psych   Within defined limits           Cardiovascular                  Exercise tolerance: <4 METS     GI/Hepatic/Renal  Within defined limits              Endo/Other  Within defined limits           Other Findings              Physical Exam    Airway  Mallampati: II  TM Distance: > 6 cm  Neck ROM: normal range of motion   Mouth opening: Normal     Cardiovascular  Regular rate and rhythm,  S1 and S2 normal,  no murmur, click, rub, or gallop             Dental  No notable dental hx       Pulmonary  Breath sounds clear to auscultation               Abdominal  GI exam deferred       Other Findings            Anesthetic Plan    ASA: 2  Anesthesia type: general          Induction: Intravenous  Anesthetic plan and risks discussed with: Patient

## 2022-10-26 NOTE — OP NOTES
Left Lung VATS Resection of Left Upper Lobe Bleb Procedure Note    Pre-operative Diagnosis:   1.) Spontaneous Left Pneumothorax - 1st Occurrence. 2.) Persistent Lung Air Leak. 3.) Admission with Tension Left Pneumothorax and S/P Left Chest Tube = Persistent Left Pneumothorax. 4.) Chronic History of Smoking + Vaping. Post-operative Diagnosis:  Same    Surgeon: Chandu Arringotn M.D. Assistant: Luiz Pereira PA-C    Anesthesia: General endotracheal anesthesia    EBL: 20 ml    Specimen : Left Upper Lung Lobe with Large Lung Bleb    Implants : none    Complications : none    Operation:   1.) Left Lung Upper Lobe VATS Bleb Resection  2.) Left VATS Mechanical Pleurodesis  3.) Left VATS Mechanical Pleurectomy  4.) Complete Left Intercostal Nerve Block - Exparel Injection  5.) Left Chest VATS Ports Regional Wound Local Anesthesia - Exparel Injection    Operative Findings: At the time of the procedure there was minimal to no adhesions of the left lung visceral pleura to the left parietal pleura of the rib cage. With careful evaluation of the left pleural space a large coalescence of left lung bleb disease was noted on the apical surface of the left upper lung lobe. No other bleb disease was found. Following full stapling and removal of the left upper lobe apex containing the bleb diseased segment of lung, the lung surface was evaluated and noted to be free of further bleb disease and no air leak was visualized. Anesthesia reported no remarkable endobronchial secretions on fiberoptic bronchoscopy. With instillation of of left pleural air via VATS devices at the beginning of the VATS procedure, the patient was noted to quickly develop SVT. Evacuation of the air resulted in resolution of the SVT and the patient remained hemodynamically satisfactory.     Upon completion of the VATS operation the patient was noted to transiently develop Sp02=82% while on two lung ventilation and closure of the left chest ports with two Straight 28 Fr chest tubes within the left pleural space and on suction. Fiberoptic bronchoscopy was performed by the Anesthesiologist resulting in slow improvement of the Sp02 back to the normal high 90% range. Indications:  See pre op diagnosis     Procedure Details     After informed consent was obtained for the above mentioned procedure, the patient was then taken to the operating room. Appropriate monitoring lines were placed by the Anesthesia Department. A complete surgical timeout was completed. A double lumen ET tube was placed and the patient positioned in the right lateral decubitus position. The left chest was prepped and draped into a sterile field after the left chest tube was removed from the patient. The original left chest tube incision skin edges were excised with scalpel and VATS thoracoscope was advanced into the left pleural space with findings as stated above. Two other separate VATS ports were established in the mid left chest wall, one anteriorly and one posteriorly. Left lung ventilation had been discontinued and careful VATS clamp application of the left upper lobe was performed and  the apical left upper lobe surface bleb coalescence was visualized. The VATS lung stapling device was placed across the base of the left upper lobe bleb segment and a normal segment of lung included in the specimen that was excised after firing two separate lung stapling devices. The specimen was removed from the left pleural space. The mechanical pleurodesis was performed in the upper left pleural space anteriorly and posteriorly. Mechanical pleurectomy was also performed in these both regions. Complete Left Intercostal Nerve Block with Exparel was instilled. Regional local anesthesia of all three VATS Port skin sites were instilled with Exparel injection.   Two 28 Fr straight chest tubes were placed within the anterior and posterior VATS port sites and secured to the skin with 0 Silk suture. All VATS wounds were closed at the subcutaneous and skin layers. The chest tubes were placed to pleurevac suction. Dressings were then applied to all wounds. The patient was then transported with monitors to the intensive care unit following extubation with satisfactory spontaneous ventilation. Physician Assistant  (PA) assistance was required due to the difficulty of the procedure. The PA assisted in the dissection of tissues, identification and exposure of pertinent anatomy including thoracoscopic VATS Left Upper Lobe Lung Bleb, and incision closure in the operation to assure optimal patient outcome and safe conduct of the operation. Ann Dodson M.D.   Cardiothoracic Surgery

## 2022-10-26 NOTE — PROGRESS NOTES
[facility]          Chief Complaint:  Follow up for ptx    Interval History:  no change. CXR with lung expanded. .      ROS:    denies SOB    Exam:   Visit Vitals  BP (!) 146/64   Pulse 61   Temp 98.1 °F (36.7 °C)   Resp 20   Ht 5' 11\" (1.803 m)   Wt 80 kg (176 lb 5.9 oz)   SpO2 100%   BMI 24.60 kg/m²        Const:  alert and oriented. NAD   CV:   RRR without murmur or rub. PMI not displaced. No peripheral edema   Respiratory:  Clear to auscultation without wheezes, rales or rhonchi. No accessory muscle use.    Chest:  no sq air    Assessment:  spon ptx due to bleb dz    PLAN:  OR today for VATS blebectomy and mech pleurodesis

## 2022-10-26 NOTE — ROUTINE PROCESS
1920:Bedside and Verbal shift change report given to Kyle LINN (oncoming nurse) by Ange Faulkner RN (offgoing nurse). Report included the following information SBAR, Kardex, Intake/Output, MAR, Recent Results, and Cardiac Rhythm SR . Quietly resting in bed. HOB elevated. No SOB on oxygen at 2 LPM. Left chest tube in placed & to 30 mm/Hg suction. Call light within reach. Reminded nothing to eat or drink after MN. Verbalized understanding. 2029: Medicated for pain per patient request. Encouraged to use incentive spirometer 5-10 X every hour while awake or watching TV. Can reached 1500.     2123: Due med given. 2336: No change from previous assessment. 2343: Medicated for pain per patient request. IVF of LR at 75 ml/hr started per order. 0013: Medicated for pain per patient request.     0200: Comfortably sleeping. 0408: No change from previous assessment. 0743: Medicated for pain per patient request.     0618: Slept good thru night. Needs attended. [facility] bath provided. 0720: Bedside and Verbal shift change report given to [address] RN  (oncoming nurse) by Lula Alberto RN (offgoing nurse). Report included the following information SBAR, Kardex, Intake/Output, MAR, Recent Results, and Cardiac Rhythm SB/SR .

## 2022-10-27 ENCOUNTER — APPOINTMENT (OUTPATIENT)
Dept: GENERAL RADIOLOGY | Age: 27
DRG: 121 | End: 2022-10-27
Attending: PHYSICIAN ASSISTANT
Payer: MEDICAID

## 2022-10-27 PROBLEM — Z98.890 S/P THORACOTOMY: Chronic | Status: ACTIVE | Noted: 2022-10-27

## 2022-10-27 LAB
BASOPHILS # BLD: 0 K/UL (ref 0–0.1)
BASOPHILS NFR BLD: 0 % (ref 0–2)
DIFFERENTIAL METHOD BLD: ABNORMAL
EOSINOPHIL # BLD: 0.1 K/UL (ref 0–0.4)
EOSINOPHIL NFR BLD: 1 % (ref 0–5)
ERYTHROCYTE [DISTWIDTH] IN BLOOD BY AUTOMATED COUNT: 14.9 % (ref 11.6–14.5)
GLUCOSE BLD STRIP.AUTO-MCNC: 102 MG/DL (ref 70–110)
HCT VFR BLD AUTO: 41 % (ref 36–48)
HGB BLD-MCNC: 13.3 G/DL (ref 13–16)
IMM GRANULOCYTES # BLD AUTO: 0.1 K/UL (ref 0–0.04)
IMM GRANULOCYTES NFR BLD AUTO: 1 % (ref 0–0.5)
LYMPHOCYTES # BLD: 2.4 K/UL (ref 0.9–3.6)
LYMPHOCYTES NFR BLD: 14 % (ref 21–52)
MCH RBC QN AUTO: 23.1 PG (ref 24–34)
MCHC RBC AUTO-ENTMCNC: 32.4 G/DL (ref 31–37)
MCV RBC AUTO: 71.2 FL (ref 78–100)
MONOCYTES # BLD: 1.1 K/UL (ref 0.05–1.2)
MONOCYTES NFR BLD: 7 % (ref 3–10)
NEUTS SEG # BLD: 13.3 K/UL (ref 1.8–8)
NEUTS SEG NFR BLD: 78 % (ref 40–73)
NRBC # BLD: 0 K/UL (ref 0–0.01)
NRBC BLD-RTO: 0 PER 100 WBC
PLATELET # BLD AUTO: 268 K/UL (ref 135–420)
PMV BLD AUTO: 10.7 FL (ref 9.2–11.8)
RBC # BLD AUTO: 5.76 M/UL (ref 4.35–5.65)
WBC # BLD AUTO: 17 K/UL (ref 4.6–13.2)

## 2022-10-27 PROCEDURE — 2709999900 HC NON-CHARGEABLE SUPPLY

## 2022-10-27 PROCEDURE — 74011250636 HC RX REV CODE- 250/636: Performed by: PHYSICIAN ASSISTANT

## 2022-10-27 PROCEDURE — 74011000250 HC RX REV CODE- 250: Performed by: INTERNAL MEDICINE

## 2022-10-27 PROCEDURE — 74011250636 HC RX REV CODE- 250/636: Performed by: INTERNAL MEDICINE

## 2022-10-27 PROCEDURE — APPNB30 APP NON BILLABLE TIME 0-30 MINS: Performed by: PHYSICIAN ASSISTANT

## 2022-10-27 PROCEDURE — 36415 COLL VENOUS BLD VENIPUNCTURE: CPT

## 2022-10-27 PROCEDURE — 65660000004 HC RM CVT STEPDOWN

## 2022-10-27 PROCEDURE — 71045 X-RAY EXAM CHEST 1 VIEW: CPT

## 2022-10-27 PROCEDURE — 99024 POSTOP FOLLOW-UP VISIT: CPT | Performed by: PHYSICIAN ASSISTANT

## 2022-10-27 PROCEDURE — 74011250637 HC RX REV CODE- 250/637: Performed by: PHYSICIAN ASSISTANT

## 2022-10-27 PROCEDURE — 99232 SBSQ HOSP IP/OBS MODERATE 35: CPT | Performed by: INTERNAL MEDICINE

## 2022-10-27 PROCEDURE — 74011000250 HC RX REV CODE- 250: Performed by: PHYSICIAN ASSISTANT

## 2022-10-27 PROCEDURE — 74011250637 HC RX REV CODE- 250/637: Performed by: INTERNAL MEDICINE

## 2022-10-27 PROCEDURE — APPNB45 APP NON BILLABLE 31-45 MINUTES: Performed by: PHYSICIAN ASSISTANT

## 2022-10-27 PROCEDURE — 82962 GLUCOSE BLOOD TEST: CPT

## 2022-10-27 PROCEDURE — 85025 COMPLETE CBC W/AUTO DIFF WBC: CPT

## 2022-10-27 RX ORDER — POTASSIUM CHLORIDE 14.9 MG/ML
20 INJECTION INTRAVENOUS ONCE
Status: DISCONTINUED | OUTPATIENT
Start: 2022-10-27 | End: 2022-10-27

## 2022-10-27 RX ADMIN — MORPHINE SULFATE 4 MG: 4 INJECTION INTRAVENOUS at 19:39

## 2022-10-27 RX ADMIN — POLYETHYLENE GLYCOL 3350 17 G: 17 POWDER, FOR SOLUTION ORAL at 08:05

## 2022-10-27 RX ADMIN — OXYCODONE HYDROCHLORIDE AND ACETAMINOPHEN 2 TABLET: 5; 325 TABLET ORAL at 03:56

## 2022-10-27 RX ADMIN — SODIUM CHLORIDE, PRESERVATIVE FREE 10 ML: 5 INJECTION INTRAVENOUS at 16:32

## 2022-10-27 RX ADMIN — SODIUM CHLORIDE, PRESERVATIVE FREE 10 ML: 5 INJECTION INTRAVENOUS at 05:56

## 2022-10-27 RX ADMIN — SODIUM CHLORIDE, PRESERVATIVE FREE 10 ML: 5 INJECTION INTRAVENOUS at 21:24

## 2022-10-27 RX ADMIN — OXYCODONE HYDROCHLORIDE AND ACETAMINOPHEN 2 TABLET: 5; 325 TABLET ORAL at 16:30

## 2022-10-27 RX ADMIN — HEPARIN SODIUM 5000 UNITS: 5000 INJECTION INTRAVENOUS; SUBCUTANEOUS at 10:49

## 2022-10-27 RX ADMIN — MORPHINE SULFATE 4 MG: 4 INJECTION INTRAVENOUS at 06:00

## 2022-10-27 RX ADMIN — SODIUM CHLORIDE, PRESERVATIVE FREE 10 ML: 5 INJECTION INTRAVENOUS at 05:55

## 2022-10-27 RX ADMIN — OXYCODONE HYDROCHLORIDE AND ACETAMINOPHEN 2 TABLET: 5; 325 TABLET ORAL at 11:51

## 2022-10-27 RX ADMIN — ONDANSETRON 4 MG: 2 INJECTION INTRAMUSCULAR; INTRAVENOUS at 10:49

## 2022-10-27 RX ADMIN — MORPHINE SULFATE 4 MG: 4 INJECTION INTRAVENOUS at 01:45

## 2022-10-27 RX ADMIN — OXYCODONE HYDROCHLORIDE AND ACETAMINOPHEN 2 TABLET: 5; 325 TABLET ORAL at 08:05

## 2022-10-27 RX ADMIN — BISACODYL 10 MG: 5 TABLET, COATED ORAL at 08:05

## 2022-10-27 RX ADMIN — FAMOTIDINE 20 MG: 20 TABLET ORAL at 17:38

## 2022-10-27 NOTE — PROGRESS NOTES
[facility] Hospitalist Group  Progress Note    Patient: Tyra Trinidad Age: 33 y.o. : 1995 MR#: [id number] [id number]: [id number]  Date/Time: 10/26/2022     C/C: Shortness of breath      Subjective:   Pt seen post procedure, complaining of pain from waist up.     Assessment/Plan:   HPI : Patient with history of tobacco abuse marijuana use vaping, admitted with tension pneumothorax s/p chest tube    -Spontaneous pneumothorax left side, w/ persistent lung air leak, persistent left PTX despite chest tube, now s/p VATS w/ blebectomy and mechanical pleurodesis on 10/26  - tobacco abuse-counseled regarding smoking cessation  -UDS positive THC  -Leukocytosis noted today, w/o other signs/symptoms of SIRS/sepsis    Plan  -Chest tube management per CTS  -Supportive care  -Will increase frequency of pain meds  -trend wbc, temp, monitor for signs/symptoms of infection    Objective:       General:  Alert, cooperative, no acute distress   HEENT: NCAT   Cardiovascular: S1S2 - regular , No Murmur   Pulmonary: Equal expansion , No Use of accessory muscles , No Rales No Rhonchi    GI:  +BS in all four quadrants, soft, non-tender  Extremities:  No edema; 2+ dorsalis pedis pulses bilaterally         DVT Prophylaxis:  []Lovenox  [x]Hep SQ  []SCDs  []Coumadin   []On Heparin gtt    [] Eliquis [] Xarelto     Vitals:         VS: Visit Vitals  /70   Pulse 61   Temp 98.1 °F (36.7 °C)   Resp 20   Ht 5' 11\" (1.803 m)   Wt 74.4 kg (164 lb 0.4 oz)   SpO2 96%   BMI 22.88 kg/m²      Tmax/24hrs: Temp (24hrs), Av °F (36.7 °C), Min:97.2 °F (36.2 °C), Max:98.6 °F (37 °C)        Medications:   Current Facility-Administered Medications   Medication Dose Route Frequency    sodium chloride (NS) flush 5-40 mL  5-40 mL IntraVENous Q8H    sodium chloride (NS) flush 5-40 mL  5-40 mL IntraVENous PRN    oxyCODONE-acetaminophen (PERCOCET) 5-325 mg per tablet 1-2 Tablet  1-2 Tablet Oral Q4H PRN    naloxone (NARCAN) injection 0.4 mg  0.4 mg IntraVENous PRN    ondansetron (ZOFRAN) injection 4 mg  4 mg IntraVENous Q6H PRN    bisacodyL (DULCOLAX) tablet 10 mg  10 mg Oral DAILY    heparin (porcine) injection 5,000 Units  5,000 Units SubCUTAneous Q12H    acetaminophen (TYLENOL) tablet 650 mg  650 mg Oral Q6H PRN    morphine injection 4 mg  4 mg IntraVENous Q3H PRN    0.9% sodium chloride infusion 250 mL  250 mL IntraVENous PRN    lactated Ringers infusion  75 mL/hr IntraVENous CONTINUOUS    lidocaine 4 % patch 2 Patch  2 Patch TransDERmal Q24H    sodium chloride (NS) flush 5-40 mL  5-40 mL IntraVENous Q8H    sodium chloride (NS) flush 5-40 mL  5-40 mL IntraVENous PRN    acetaminophen (TYLENOL) suppository 650 mg  650 mg Rectal Q6H PRN    polyethylene glycol (MIRALAX) packet 17 g  17 g Oral DAILY PRN    promethazine (PHENERGAN) tablet 12.5 mg  12.5 mg Oral Q6H PRN    famotidine (PEPCID) tablet 20 mg  20 mg Oral QPM    albuterol-ipratropium (DUO-NEB) 2.5 MG-0.5 MG/3 ML  3 mL Nebulization Q6H PRN       Labs:    Recent Labs     10/26/22  1141 10/25/22  0420 10/24/22  0436   WBC 19.6* 10.3 10.3   HGB 15.2 14.7 14.5   HCT 47.7 45.3 45.6    292 266     Recent Labs     10/25/22  1309 10/25/22  0420 10/24/22  0436   NA  --  139 138   K  --  3.8 3.8   CL  --  103 104   CO2  --  29 29   GLU  --  88 79   BUN  --  9 8   CREA  --  0.96 0.92   CA  --  9.6 9.1   MG  --  2.0 1.8   INR 1.0  --   --          Time spent on direct patient care >30 mints     Complexity : High complex - due to multiple medical issues outlined above. CODE Status : Full code    Case discussed with:  [x]Patient  [] Family  []Nursing  []Case Management         Disclaimer: Sections of this note are dictated utilizing voice recognition software, which may have resulted in some phonetic based errors in grammar and contents. Even though attempts were made to correct all the mistakes, some may have been missed, and remained in the body of the document.  If questions arise, please contact our department.     Signed By: Titus Pederson MD     October 26, 2022

## 2022-10-27 NOTE — PROGRESS NOTES
Bedside and Verbal shift change report given to Gena Siemens  (oncoming nurse) by Noe Castro RN (offgoing nurse). Report included the following information Kardex, ED Summary, OR Summary, Procedure Summary, MAR, Recent Results, Cardiac Rhythm  , and Alarm Parameters .

## 2022-10-27 NOTE — CONSULTS
[facility]  Pulmonary, Critical Care, and Sleep Medicine    Name: Tiara Gonzalez MRN: [id number]   : 1995 Hospital: [address] [place] [address] Dr   Date: 10/27/2022        Pulmonary Follow-up In-Patient Consult                                              Consult requesting physician: Dr. Murray Miranda  Reason for Consult: Spontaneous pneumothorax    IMPRESSION:   Spontaneous pneumothorax on left side now resolved s/p VATS with blebectomy and mechanical pleurodesis on 10/26  Tobacco abuse  Microcytosis: thalassemia versus iron deficiency  THC + on drug screen        RECOMMENDATIONS:   Chest tube management per CT surgery  Daily CXR  Titrate O2 to maintain sats > 92%. Pain management per primary team  Nutrition: per primary team  Replace electrolytes  HOB >=30 degree, aggressive pulmonary toileting, incentive spirometry, PT/OT eval and treat  GI Prophylaxis: per primary team  not indicated  DVT Prophylaxis: SCDs recommended   Smoking and THC cessation counseling done by me and spent >11 minutes on it. Will follow     Subjective/History:     Tiara Gonzalez is a 33 y.o. male with PMHx significant for tobacco use who had sudden onset this morning of left sided chest pain and dyspnea when we awoke from sleep. No previous ill contacts, other symptoms or trauma. Upon presentation to ED, left sided pneumothorax diagnosed and chest tube placed on left side without complication and noted to have full lung re-expansion. Currently has moderate left sided pain and has no dyspnea on 4 L/min of supplemental O2.      10/26/2022    S/P VATs with Blebectomy and mechanical pleurodesis yesterday without complication  New L sided CT x2  in place with no air leak and minimal additional drainage overnight; about 122 ml  Patient notes pain with deep inspiration.   Girlfriend at bedside asking for estimated discharge date  Plan to ambulate on the carlson today    Review of Systems:  A comprehensive review of systems was negative except for that written in the HPI. No Known Allergies     History reviewed. No pertinent past medical history. History reviewed. No pertinent surgical history.      Family History   Problem Relation Age of Onset    Cancer Father         Social History     Tobacco Use    Smoking status: Every Day     Types: Cigarettes    Smokeless tobacco: Never   Substance Use Topics    Alcohol use: Never      Prior to Admission medications    Not on File       Current Facility-Administered Medications   Medication Dose Route Frequency    sodium chloride (NS) flush 5-40 mL  5-40 mL IntraVENous Q8H    sodium chloride (NS) flush 5-40 mL  5-40 mL IntraVENous PRN    oxyCODONE-acetaminophen (PERCOCET) 5-325 mg per tablet 1-2 Tablet  1-2 Tablet Oral Q4H PRN    naloxone (NARCAN) injection 0.4 mg  0.4 mg IntraVENous PRN    ondansetron (ZOFRAN) injection 4 mg  4 mg IntraVENous Q6H PRN    bisacodyL (DULCOLAX) tablet 10 mg  10 mg Oral DAILY    heparin (porcine) injection 5,000 Units  5,000 Units SubCUTAneous Q12H    acetaminophen (TYLENOL) tablet 650 mg  650 mg Oral Q6H PRN    morphine injection 4 mg  4 mg IntraVENous Q3H PRN    0.9% sodium chloride infusion 250 mL  250 mL IntraVENous PRN    lactated Ringers infusion  75 mL/hr IntraVENous CONTINUOUS    lidocaine 4 % patch 2 Patch  2 Patch TransDERmal Q24H    sodium chloride (NS) flush 5-40 mL  5-40 mL IntraVENous Q8H    sodium chloride (NS) flush 5-40 mL  5-40 mL IntraVENous PRN    acetaminophen (TYLENOL) suppository 650 mg  650 mg Rectal Q6H PRN    polyethylene glycol (MIRALAX) packet 17 g  17 g Oral DAILY PRN    promethazine (PHENERGAN) tablet 12.5 mg  12.5 mg Oral Q6H PRN    famotidine (PEPCID) tablet 20 mg  20 mg Oral QPM    albuterol-ipratropium (DUO-NEB) 2.5 MG-0.5 MG/3 ML  3 mL Nebulization Q6H PRN         Objective:   Vital Signs:    Visit Vitals  /65 (BP 1 Location: Right upper arm, BP Patient Position: Sitting)   Pulse 64   Temp 98.3 °F (36.8 °C)   Resp 19   Ht 5' 11\" (1.803 m)   Wt 72 kg (158 lb 12.8 oz)   SpO2 96%   BMI 22.15 kg/m²       O2 Device: None (Room air)   O2 Flow Rate (L/min): 2 l/min   Temp (24hrs), Av.2 °F (36.8 °C), Min:97.9 °F (36.6 °C), Max:98.6 °F (37 °C)       Intake/Output:   Last shift:      10/27 07 - 10/27 1900  In: 0   Out: 310 [Urine:300]  Last 3 shifts: 10/25 1901 - 10/27 07  In: 4503 [P.O.:910; I.V.:1840]  Out: 4182 [Urine:4025]    Intake/Output Summary (Last 24 hours) at 10/27/2022 1039  Last data filed at 10/27/2022 0800  Gross per 24 hour   Intake 1960 ml   Output 3537 ml   Net -1577 ml         Physical Exam:     General:  Alert, Awake, NAD, cooperative, no distress, appears stated age. Head:   Normocephalic, without obvious abnormality, atraumatic. Eye:   Conjunctivae/corneas clear. PERRLA, no scleral icterus, no pallor, no cyanosis  Nose:   Nares normal. Septum midline. Mucosa normal without erythema/exudate. No drainage/discharge. No sinus tenderness. Throat:  Lips, mucosa, and tongue normal. Teeth and gums normal. No tonsillar enlargement, no erythema, no exudates, no oral thrush  Neck:   Supple, symmetric, thyroid: no enlargement/tenderness/nodule, no JVD, no carotid bruit, no lymphadenopathy. Trachea midline  Back & spine: Symmetric, no curvature. Chest wall: Chest tube in place left thorax  Lung:   Adequate air entry bilateral equal, no rales, no rhonchi, no wheezing. No dullness on percussion. Heart:   Regular rate & rhythm. S1 S2 present, no murmur, no gallop, no click, no rub  Abdomen:  Soft, NT, ND, +BS, no masses, no organomegaly  Extremities:  No pedal edema, no cyanosis, no clubbing  Pulses: 2+ and symmetric in DP  Lymphatic:  No cervical, supraclavicular and axillary palpable lymphadenopathy. Musculoskeletal: No joint swelling or tenderness.   Neurologic:  Grossly non focal.        Data:         Chemistry Recent Labs     10/25/22  0420   GLU 88      K 3.8      CO2 29   BUN 9   CREA 0.96   CA 9.6   MG 2. 0   AGAP 7   BUCR 9*          Lactic Acid No results found for: LAC  No results for input(s): LAC in the last 72 hours. Liver Enzymes Protein, total   Date Value Ref Range Status   10/22/2022 7.3 6.4 - 8.2 g/dL Final     Albumin   Date Value Ref Range Status   10/22/2022 4.1 3.4 - 5.0 g/dL Final     Globulin   Date Value Ref Range Status   10/22/2022 3.2 2.0 - 4.0 g/dL Final     A-G Ratio   Date Value Ref Range Status   10/22/2022 1.3 0.8 - 1.7   Final     Alk. phosphatase   Date Value Ref Range Status   10/22/2022 60 45 - 117 U/L Final     No results for input(s): TP, ALB, GLOB, AGRAT, AP, TBIL in the last 72 hours. No lab exists for component: SGOT, GPT, DBIL       CBC w/Diff Recent Labs     10/27/22  0510 10/26/22  1141 10/25/22  0420   WBC 17.0* 19.6* 10.3   RBC 5.76* 6.45* 6.34*   HGB 13.3 15.2 14.7   HCT 41.0 47.7 45.3    328 292   GRANS 78*  --   --    LYMPH 14*  --   --    EOS 1  --   --           Cardiac Enzymes No results found for: CPK, CK, CKMMB, CKMB, RCK3, CKMBT, CKNDX, CKND1, RAMBO, TROPT, TROIQ, BINTA, TROPT, TNIPOC, BNP, BNPP     BNP No results found for: BNP, BNPP, XBNPT     Coagulation Recent Labs     10/25/22  1309   PTP 13.1   INR 1.0   APTT 30.5           Thyroid  No results found for: T4, T3U, TSH, TSHEXT, TSHEXT       Lipid Panel No results found for: CHOL, CHOLPOCT, CHOLX, CHLST, CHOLV, 030255, HDL, HDLP, LDL, LDLC, DLDLP, 384444, VLDLC, VLDL, TGLX, TRIGL, TRIGP, TGLPOCT, CHHD, CHHDX     ABG No results for input(s): PHI, PHI, POC2, PCO2I, PO2, PO2I, HCO3, HCO3I, FIO2, FIO2I in the last 72 hours.      Urinalysis Lab Results   Component Value Date/Time    Color YELLOW 10/25/2022 02:47 PM    Appearance CLEAR 10/25/2022 02:47 PM    Specific gravity 1.012 10/25/2022 02:47 PM    pH (UA) 8.0 10/25/2022 02:47 PM    Protein Negative 10/25/2022 02:47 PM    Glucose Negative 10/25/2022 02:47 PM    Ketone Negative 10/25/2022 02:47 PM    Bilirubin Negative 10/25/2022 02:47 PM    Urobilinogen 1.0 10/25/2022 02:47 PM    Nitrites Negative 10/25/2022 02:47 PM    Leukocyte Esterase Negative 10/25/2022 02:47 PM        Micro  No results for input(s): SDES, CULT in the last 72 hours. No results for input(s): CULT in the last 72 hours. XR (Most Recent). CXR reviewed by me and compared with previous CXR Results from Hospital Encounter encounter on 10/22/22    XR CHEST PORT    Narrative  Portable CXR:    HISTORY: Post surgery. COMPARISON: 0536 hours    No left pneumothorax. Additional left chest tube placement, for a total of 2. Mild vascular congestion in the right lung. Atelectasis/infiltrate in the right  lung base. Cardiac silhouette is normal in size. Impression  As described       CT (Most Recent) Results from Hospital Encounter encounter on 10/22/22    CT CHEST WO CONT    Narrative  EXAM: CT CHEST WITHOUT CONTRAST. CLINICAL HISTORY/INDICATION:  32 yo man with no past medical history but is a  smoker had acute onset left sided pneumothorax now s/p chest tube. Please  evaluate with CT scan for any anatomic/parenchymal abnormality that may have  contributed to PTX.    COMPARISON: Chest x-ray 10/22, 10/23/2022. TECHNIQUE: Standard helical images were obtained from the thoracic inlet through  the adrenals at 5 mm thick sections without intravenous contrast.  Coronal and sagittal reformations obtained. Images were reviewed on both soft  tissue, lung, and bone window settings. All CT scans at this facility are performed using dose optimization technique as  appropriate to a performed exam, to include automated exposure control,  adjustment of the mA and/or kV according to patient's size (including  appropriate matching for site-specific examinations), or use of iterative  reconstruction technique. FINDINGS:    2 small bulla at the left lung apex medially. The larger measures 2.7 x 2.1 x  3.8 cm. Axial image 11, coronal image 38.     Subsegmental atelectasis at the left medial posterior lower lobe. Minimal  dependent atelectasis at the right base. Very small residual pneumothorax measuring less than 1 cm. Left thoracostomy tube extends ventrally and superiorly to terminate at the  medial pleural space just anterior to the aortic arch. There is no evidence of mediastinal, hilar, nor axillary adenopathy. Evaluation of the mediastinum and karmen is limited by the lack of IV contrast.  The great vessels and thoracic aorta are unremarkable. There are no pleural effusions. Small amount of subcutaneous emphysema along the left chest wall. The included portion of the of the liver is unremarkable. The adrenal glands are normal.    The chest wall soft tissues are unremarkable. The bony structures are unremarkable. Impression  2 small left apical medial bulla/bleb. Subcentimeter residual left pneumothorax. Left chest tube in position. Minimal medial left lower lobe and right posterior costophrenic gutter  subsegmental atelectasis. EKG No results found for this or any previous visit. ECHO No results found for this or any previous visit. PFT No flowsheet data found.      Other ASA reactivity:   Pre-albumin:   Ionized Calcium:   NH4:   T3, FT4:  Cortisol:  Urine Osm:  Urine Lytes:   HbA1c:      Recent Results (from the past 24 hour(s))   CBC W/O DIFF    Collection Time: 10/26/22 11:41 AM   Result Value Ref Range    WBC 19.6 (H) 4.6 - 13.2 K/uL    RBC 6.45 (H) 4.35 - 5.65 M/uL    HGB 15.2 13.0 - 16.0 g/dL    HCT 47.7 36.0 - 48.0 %    MCV 74.0 (L) 78.0 - 100.0 FL    MCH 23.6 (L) 24.0 - 34.0 PG    MCHC 31.9 31.0 - 37.0 g/dL    RDW 15.6 (H) 11.6 - 14.5 %    PLATELET 882 658 - 875 K/uL    MPV 10.6 9.2 - 11.8 FL    NRBC 0.0 0  WBC    ABSOLUTE NRBC 0.00 0.00 - 0.01 K/uL   CBC WITH AUTOMATED DIFF    Collection Time: 10/27/22  5:10 AM   Result Value Ref Range    WBC 17.0 (H) 4.6 - 13.2 K/uL    RBC 5.76 (H) 4.35 - 5.65 M/uL    HGB 13.3 13.0 - 16.0 g/dL    HCT 41.0 36.0 - 48.0 %    MCV 71.2 (L) 78.0 - 100.0 FL    MCH 23.1 (L) 24.0 - 34.0 PG    MCHC 32.4 31.0 - 37.0 g/dL    RDW 14.9 (H) 11.6 - 14.5 %    PLATELET 022 200 - 138 K/uL    MPV 10.7 9.2 - 11.8 FL    NRBC 0.0 0  WBC    ABSOLUTE NRBC 0.00 0.00 - 0.01 K/uL    NEUTROPHILS 78 (H) 40 - 73 %    LYMPHOCYTES 14 (L) 21 - 52 %    MONOCYTES 7 3 - 10 %    EOSINOPHILS 1 0 - 5 %    BASOPHILS 0 0 - 2 %    IMMATURE GRANULOCYTES 1 (H) 0.0 - 0.5 %    ABS. NEUTROPHILS 13.3 (H) 1.8 - 8.0 K/UL    ABS. LYMPHOCYTES 2.4 0.9 - 3.6 K/UL    ABS. MONOCYTES 1.1 0.05 - 1.2 K/UL    ABS. EOSINOPHILS 0.1 0.0 - 0.4 K/UL    ABS. BASOPHILS 0.0 0.0 - 0.1 K/UL    ABS. IMM. GRANS. 0.1 (H) 0.00 - 0.04 K/UL    DF AUTOMATED           Telemetry:normal sinus rhythm    The patient is: [x] acutely ill Risk of deterioration: [x] moderate    [] critically ill  [] high     [x]See my orders for details    My assessment, plan of care, findings, medications, side effects etc were discussed with:  [] Nurse [] PT/OT    [] Respiratory therapy [x] Dr. Aydee Cespedes   [] Family: answered all questions to satisfaction [x] Patient: answered all questions to satisfaction   [] Pharmacist []      [x] Total critical care time exclusive of procedures 30 minutes with complex decision making, coordination of care and counseling patient performed and > 50% time spent in face to face evaluation.       Mindy Olivares MD  10/27/2022

## 2022-10-27 NOTE — ANESTHESIA POSTPROCEDURE EVALUATION
Procedure(s):  LEFT VIDEO ASSISTED THORACOSCOPIC BLEBECTOMY. general    Anesthesia Post Evaluation      Multimodal analgesia: multimodal analgesia used between 6 hours prior to anesthesia start to PACU discharge  Patient location during evaluation: ICU  Patient participation: complete - patient participated  Level of consciousness: awake  Pain score: 5  Airway patency: patent  Anesthetic complications: no  Cardiovascular status: acceptable  Respiratory status: acceptable  Hydration status: acceptable  Post anesthesia nausea and vomiting:  none  Final Post Anesthesia Temperature Assessment:  Normothermia (36.0-37.5 degrees C)      INITIAL Post-op Vital signs:   Vitals Value Taken Time   /65 10/27/22 0600   Temp 36.7 °C (98 °F) 10/27/22 0400   Pulse 62 10/27/22 0649   Resp 16 10/27/22 0649   SpO2 97 % 10/27/22 0649   Vitals shown include unvalidated device data.

## 2022-10-27 NOTE — PROGRESS NOTES
CARDIOTHORACIC SURGERY PROGRESS NOTE    10/27/2022  10:53 AM     CC:  Post Operative Day # 1     Chart reviewed. Interval History/Events of Past 24 hours:   Out of bed in chair. Total CT drainage 122 mL. No airleak but +tidaling. Pain management challenges but being compliant with incentive spirometry. No nausea or abdominal pain    Subjective:  Patient seen and examined on rounds today. Chief Complaint: Incisional pain  Pain level: Moderate to high  Ambulating: Fairly well     Objective:  Vital signs:   Visit Vitals  /65 (BP 1 Location: Right upper arm, BP Patient Position: Sitting)   Pulse 64   Temp 98.3 °F (36.8 °C)   Resp 19   Ht 5' 11\" (1.803 m)   Wt 72 kg (158 lb 12.8 oz)   SpO2 96%   BMI 22.15 kg/m²     Temp (24hrs), Av.2 °F (36.8 °C), Min:97.9 °F (36.6 °C), Max:98.6 °F (37 °C)    Admission Weight: Last Weight   Weight: 77.1 kg (170 lb) Weight: 72 kg (158 lb 12.8 oz)     Physical Examination:     General:  Alert, oriented   Lungs: Clear to ascultation without rales, wheezes or rhonchi. Inspiratory effort Good. Chest:  Dressings clean and dry. No SC air. Heart:  RRR without rub or murmur. Abdomen: Active sounds. Soft and non-tender without masses. Extremities:  Edema not present. Warm and well perfused. Neuro: No deficit.      DVT Prophylaxis:   pneumatic compression boots: Yes  Compression stockings:  Yes  Heparin:  Yes    DME needs:  TBD          Labs:  Lab Results   Component Value Date/Time    WBC 17.0 (H) 10/27/2022 05:10 AM    HCT 41.0 10/27/2022 05:10 AM     10/27/2022 05:10 AM      Lab Results   Component Value Date/Time     10/25/2022 04:20 AM    K 3.8 10/25/2022 04:20 AM     10/25/2022 04:20 AM    CO2 29 10/25/2022 04:20 AM    GLU 88 10/25/2022 04:20 AM    BUN 9 10/25/2022 04:20 AM    CREA 0.96 10/25/2022 04:20 AM    CREA 0.92 10/24/2022 04:36 AM    CREA 1.06 10/23/2022 05:25 AM           CXR: Possible small left lateral pneumothorax versus skinfold. Assessment:  S/P  Left VATS for blebectomy and mechanical pleurodesis  Respiratory parameters stable  Cardiac status stable     Plans:  1. Likely chest tube removal tomorrow. 2.  Ambulation and incentive spirometry encouraged  3. Endorsed smoking cessation with patient and his girlfriend  4. Transfer to stepdown    Possible discharge 2 days. Pillo Ordonez PA-C    PLEASE NOTE:  This document has been produced using voice recognition software. Unrecognized errors in transcription may be present. NOTE TO PATIENT:  The purpose of this note is to communicate optimally with the other providers involved in your care. It is written using standard medical terminology. If you have questions regarding details of the note please call my office at [phone] and make an appointment to discuss your concerns.

## 2022-10-27 NOTE — PROGRESS NOTES
Bedside and Verbal shift change report received from [address] [place] [address]  (offgoing nurse). Report included the following information Kardex, OR Summary, Procedure Summary, Intake/Output, MAR, Recent Results, Cardiac Rhythm  , and Alarm Parameters .    Care assumed  assessment done   Post procedure pain controlled by Morphine  and Oxycodone   0600 OOB to chair tolerated well   Wound Prevention Checklist    Patient: Rick Montero (31 y.o. male)  Date: 10/27/2022  Diagnosis: Pneumothorax [J93.9] <principal problem not specified>    Precautions:         []  Heel prevention boots placed on patient    [x]  Patient turned q2h during shift    []  Lift team ordered    [x]  Patient on [place] bed/Specialty bed    []  Each Wound is documented during shift (Stage, Color, drainage, odor, measurements, and dressings)    [x]  Dual skin check done with Nish Arzate RN

## 2022-10-27 NOTE — PROGRESS NOTES
[facility]  Progress Note    Patient: Marlon Odell Age: 33 y.o. : 1995 MR#: [id number]: [id number]  Date/Time: 10/27/2022     C/C: Shortness of breath      Subjective:   Pt  w/o complaints, appeared comfortable. Assessment/Plan:   HPI : Patient with history of tobacco abuse marijuana use vaping, admitted with tension pneumothorax s/p chest tube    -Spontaneous pneumothorax left side, had persistent lung air leak, persistent left PTX despite chest tube, now s/p VATS w/ blebectomy and mechanical pleurodesis on 10/26  - tobacco abuse-counseled regarding smoking cessation  -UDS positive THC  -Leukocytosis , w/o other signs/symptoms of SIRS/sepsis, wbc trending down today.     Plan  -Chest tube management per CTS  -Supportive care  -Pain control  -trend wbc, temp, monitor for signs/symptoms of infection    Objective:       General:  Alert, cooperative, no acute distress   HEENT: NCAT   Cardiovascular: S1S2 - regular , No Murmur   Pulmonary: Equal expansion , No Use of accessory muscles , No Rales No Rhonchi    GI:  +BS in all four quadrants, soft, non-tender  Extremities:  No edema; 2+ dorsalis pedis pulses bilaterally         DVT Prophylaxis:  []Lovenox  [x]Hep SQ  []SCDs  []Coumadin   []On Heparin gtt    [] Eliquis [] Xarelto     Vitals:         VS: Visit Vitals  /69   Pulse 69   Temp 98.7 °F (37.1 °C)   Resp 26   Ht 5' 11\" (1.803 m)   Wt 72 kg (158 lb 12.8 oz)   SpO2 96%   BMI 22.15 kg/m²      Tmax/24hrs: Temp (24hrs), Av.2 °F (36.8 °C), Min:97.9 °F (36.6 °C), Max:98.7 °F (37.1 °C)        Medications:   Current Facility-Administered Medications   Medication Dose Route Frequency    sodium chloride (NS) flush 5-40 mL  5-40 mL IntraVENous Q8H    sodium chloride (NS) flush 5-40 mL  5-40 mL IntraVENous PRN    oxyCODONE-acetaminophen (PERCOCET) 5-325 mg per tablet 1-2 Tablet  1-2 Tablet Oral Q4H PRN    naloxone (NARCAN) injection 0.4 mg  0.4 mg IntraVENous PRN    ondansetron (ZOFRAN) injection 4 mg  4 mg IntraVENous Q6H PRN    bisacodyL (DULCOLAX) tablet 10 mg  10 mg Oral DAILY    heparin (porcine) injection 5,000 Units  5,000 Units SubCUTAneous Q12H    acetaminophen (TYLENOL) tablet 650 mg  650 mg Oral Q6H PRN    morphine injection 4 mg  4 mg IntraVENous Q3H PRN    0.9% sodium chloride infusion 250 mL  250 mL IntraVENous PRN    lidocaine 4 % patch 2 Patch  2 Patch TransDERmal Q24H    sodium chloride (NS) flush 5-40 mL  5-40 mL IntraVENous Q8H    sodium chloride (NS) flush 5-40 mL  5-40 mL IntraVENous PRN    acetaminophen (TYLENOL) suppository 650 mg  650 mg Rectal Q6H PRN    polyethylene glycol (MIRALAX) packet 17 g  17 g Oral DAILY PRN    promethazine (PHENERGAN) tablet 12.5 mg  12.5 mg Oral Q6H PRN    famotidine (PEPCID) tablet 20 mg  20 mg Oral QPM    albuterol-ipratropium (DUO-NEB) 2.5 MG-0.5 MG/3 ML  3 mL Nebulization Q6H PRN       Labs:    Recent Labs     10/27/22  0510 10/26/22  1141 10/25/22  0420   WBC 17.0* 19.6* 10.3   HGB 13.3 15.2 14.7   HCT 41.0 47.7 45.3    328 292     Recent Labs     10/25/22  1309 10/25/22  0420   NA  --  139   K  --  3.8   CL  --  103   CO2  --  29   GLU  --  88   BUN  --  9   CREA  --  0.96   CA  --  9.6   MG  --  2.0   INR 1.0  --          Time spent on direct patient care >30 mints     Complexity : High complex - due to multiple medical issues outlined above. CODE Status : Full code    Case discussed with:  [x]Patient  [] Family  []Nursing  []Case Management         Disclaimer: Sections of this note are dictated utilizing voice recognition software, which may have resulted in some phonetic based errors in grammar and contents. Even though attempts were made to correct all the mistakes, some may have been missed, and remained in the body of the document. If questions arise, please contact our department.     Signed By: Jony Spencer MD     October 27, 2022

## 2022-10-27 NOTE — PROGRESS NOTES
Chart reviewed. No d/c needs identified at this time  CM will continue to follow.           EDITH SuN RN  Care Management  Pager: [phone]

## 2022-10-27 NOTE — PROGRESS NOTES
Physician Progress Note      PATIENT:               Micaela Price  CSN #:                  [id number]  :                       1995  ADMIT DATE:       10/22/2022 9:00 AM  DISCH DATE:  RESPONDING  PROVIDER #:        Tiana Kenney MD          QUERY TEXT:    Patient admitted with persistent left PTX despite chest tube, now s/p VATS w/ blebectomy and mechanical pleurodesis on 10/26 and noted to have  10/27WBC 17.0 - 10/26 WBC 19.6  , RR 31,26 -PULSE 94 ,108/48 ,96/49 102/48   bp MAP 60 . If possible, please document in progress notes and discharge summary if you are evaluating and/or treating any of the following: The medical record reflects the following:  Risk Factors: Spontaneous pneumothorax left side, w/ persistent lung air leak, persistent left PTX despite chest tube, now s/p VATS w/ blebectomy and mechanical pleurodesis on 10/26    Clinical Indicators: 10/27 WBC 17.0 ,Neuts 78 ,ABC 13.3  10/26 WBC 19.6  , RR 21,31,26 --PULSE 98 ,90,90 94 ,108/48 ,96/49 102/48   bp MAP 60  Card Preop -Dx  Spontaneous Left Pneumothorax - 1st Occurrence. Persistent Lung Air Leak. Admission with Tension Left Pneumothorax and S/P Left Chest Tube = Persistent Left Pneumothorax.) Chronic History of Smoking tobacco and   marijuana+ Vaping    Treatment:  continued  IV LR ,completed 10/2711am ,Labs /VS carefully monitored ,Pulm.  and Card [facility] consults    Thank you  Remedios Domingo RN CRCR [facility]  [email or web address]  Options provided:  -- SIRS of non-infectious origin due to tension pneumothorax s/p VATS w/ blebectomy and mechanical pleurodesis   without acute organ dysfunction  -- SIRS of non-infectious origin due to  with associated acute organ dysfunction of hypotension  -- Other - I will add my own diagnosis  -- Disagree - Not applicable / Not valid  -- Disagree - Clinically unable to determine / Unknown  -- Refer to Clinical Documentation Reviewer    PROVIDER RESPONSE TEXT:    This patient has SIRS of non-infectious origin due to tension pneumothorax s/p VATS w/ blebectomy and mechanical pleurodesis without acute organ dysfunction. Query created by:  Sophie Heard on 10/27/2022 2:50 PM      Electronically signed by:  Bertha Reid MD 10/27/2022 3:48 PM

## 2022-10-27 NOTE — PROGRESS NOTES
07:15  Received pt up in chair from [facility], [address] [place] [address]. Pt resting peacefully. Cts to constant wall suction without air leak. VSS. Heart rhythm Sinus rhythm with frequent PVCs. Wound Prevention Checklist    Patient: Peter Montenegro (31 y.o. male)  Date: 10/27/2022  Diagnosis: Pneumothorax [J93.9] <principal problem not specified>    Precautions:         []  Heel prevention boots placed on patient    []  Patient turned q2h during shift    []  Lift team ordered    []  Patient on [place] bed/Specialty bed    []  Each Wound is documented during shift (Stage, Color, drainage, odor, measurements, and dressings)    [x]  Dual skin check done with KAVEH Tobar RN   10:50  Pt medicated with 4 mg zofran for n/v.  Emesis approx 100 ccs  11:13  Pt ambulated around outside of nursing unit. Pt tolerated ambulation without supplemental oxygen - maintained sp02 sats greater than 94%, HR less than 100 bpm, RR less than 30/min. 14:00  Anterior left chest tube removed by WIL Hernandes.  18:10 TRANSFER - OUT REPORT:  All belongings including cell phone, Carlos Manuel Josh and clothing to rm 21  with pt. Verbal report given to KAVEH Tao and Sumaya Nichols RN(name) on Peter Montenegro  being transferred to rm 2311 Step Down(unit) for routine progression of care       Report consisted of patients Situation, Background, Assessment and   Recommendations(SBAR). Information from the following report(s) SBAR, Procedure Summary, Intake/Output, MAR, Recent Results, and Cardiac Rhythm Sinus rhythm with frequent PVCS  was reviewed with the receiving nurse.     Lines:   Peripheral IV 10/25/22 Anterior;Right Forearm (Active)   Site Assessment Clean, dry, & intact 10/27/22 1600   Phlebitis Assessment 0 10/27/22 1600   Infiltration Assessment 0 10/27/22 1600   Dressing Status Clean, dry, & intact 10/27/22 1600   Dressing Type Transparent 10/27/22 1600   Hub Color/Line Status Blue;Flushed 10/27/22 1600   Action Taken Dressing reinforced 10/27/22 1600   Alcohol Cap Used Yes 10/27/22 1600       Peripheral IV 10/26/22 Posterior;Right Hand (Active)   Site Assessment Clean, dry, & intact 10/27/22 1600   Phlebitis Assessment 0 10/27/22 1600   Infiltration Assessment 0 10/27/22 1600   Dressing Status New 10/27/22 1600   Dressing Type Transparent 10/27/22 1600   Hub Color/Line Status Pink;Flushed 10/27/22 1600   Action Taken Dressing changed; Open ports on tubing capped 10/27/22 1600   Alcohol Cap Used Yes 10/27/22 1600        Opportunity for questions and clarification was provided.       Patient transported with:   Monitor  Registered Nurse

## 2022-10-28 ENCOUNTER — APPOINTMENT (OUTPATIENT)
Dept: GENERAL RADIOLOGY | Age: 27
DRG: 121 | End: 2022-10-28
Attending: PHYSICIAN ASSISTANT
Payer: MEDICAID

## 2022-10-28 VITALS
RESPIRATION RATE: 16 BRPM | OXYGEN SATURATION: 97 % | BODY MASS INDEX: 22.19 KG/M2 | SYSTOLIC BLOOD PRESSURE: 136 MMHG | TEMPERATURE: 98 F | WEIGHT: 158.5 LBS | HEIGHT: 71 IN | DIASTOLIC BLOOD PRESSURE: 65 MMHG | HEART RATE: 68 BPM

## 2022-10-28 LAB
BASOPHILS # BLD: 0 K/UL (ref 0–0.1)
BASOPHILS NFR BLD: 0 % (ref 0–2)
DIFFERENTIAL METHOD BLD: ABNORMAL
EOSINOPHIL # BLD: 0.4 K/UL (ref 0–0.4)
EOSINOPHIL NFR BLD: 4 % (ref 0–5)
ERYTHROCYTE [DISTWIDTH] IN BLOOD BY AUTOMATED COUNT: 14.6 % (ref 11.6–14.5)
HCT VFR BLD AUTO: 41.1 % (ref 36–48)
HGB BLD-MCNC: 13.2 G/DL (ref 13–16)
IMM GRANULOCYTES # BLD AUTO: 0 K/UL (ref 0–0.04)
IMM GRANULOCYTES NFR BLD AUTO: 0 % (ref 0–0.5)
LYMPHOCYTES # BLD: 2.2 K/UL (ref 0.9–3.6)
LYMPHOCYTES NFR BLD: 21 % (ref 21–52)
MCH RBC QN AUTO: 23.4 PG (ref 24–34)
MCHC RBC AUTO-ENTMCNC: 32.1 G/DL (ref 31–37)
MCV RBC AUTO: 72.7 FL (ref 78–100)
MONOCYTES # BLD: 1.1 K/UL (ref 0.05–1.2)
MONOCYTES NFR BLD: 11 % (ref 3–10)
NEUTS SEG # BLD: 6.9 K/UL (ref 1.8–8)
NEUTS SEG NFR BLD: 64 % (ref 40–73)
NRBC # BLD: 0 K/UL (ref 0–0.01)
NRBC BLD-RTO: 0 PER 100 WBC
PLATELET # BLD AUTO: 298 K/UL (ref 135–420)
PMV BLD AUTO: 10.8 FL (ref 9.2–11.8)
RBC # BLD AUTO: 5.65 M/UL (ref 4.35–5.65)
WBC # BLD AUTO: 10.6 K/UL (ref 4.6–13.2)

## 2022-10-28 PROCEDURE — 71045 X-RAY EXAM CHEST 1 VIEW: CPT

## 2022-10-28 PROCEDURE — 94762 N-INVAS EAR/PLS OXIMTRY CONT: CPT

## 2022-10-28 PROCEDURE — 71046 X-RAY EXAM CHEST 2 VIEWS: CPT

## 2022-10-28 PROCEDURE — 74011000250 HC RX REV CODE- 250: Performed by: PHYSICIAN ASSISTANT

## 2022-10-28 PROCEDURE — 85025 COMPLETE CBC W/AUTO DIFF WBC: CPT

## 2022-10-28 PROCEDURE — 99239 HOSP IP/OBS DSCHRG MGMT >30: CPT | Performed by: INTERNAL MEDICINE

## 2022-10-28 PROCEDURE — 74011250637 HC RX REV CODE- 250/637: Performed by: PHYSICIAN ASSISTANT

## 2022-10-28 PROCEDURE — 74011250636 HC RX REV CODE- 250/636: Performed by: PHYSICIAN ASSISTANT

## 2022-10-28 PROCEDURE — 2709999900 HC NON-CHARGEABLE SUPPLY

## 2022-10-28 PROCEDURE — 74011250636 HC RX REV CODE- 250/636: Performed by: INTERNAL MEDICINE

## 2022-10-28 PROCEDURE — 74011000250 HC RX REV CODE- 250: Performed by: INTERNAL MEDICINE

## 2022-10-28 PROCEDURE — 36415 COLL VENOUS BLD VENIPUNCTURE: CPT

## 2022-10-28 PROCEDURE — 99024 POSTOP FOLLOW-UP VISIT: CPT | Performed by: PHYSICIAN ASSISTANT

## 2022-10-28 RX ORDER — OXYCODONE AND ACETAMINOPHEN 5; 325 MG/1; MG/1
1 TABLET ORAL
Qty: 12 TABLET | Refills: 0 | Status: SHIPPED | OUTPATIENT
Start: 2022-10-28 | End: 2022-10-31

## 2022-10-28 RX ORDER — ACETAMINOPHEN 500 MG
500 TABLET ORAL
Qty: 90 TABLET | Refills: 0 | Status: SHIPPED | OUTPATIENT
Start: 2022-10-28

## 2022-10-28 RX ORDER — BISACODYL 5 MG
10 TABLET, DELAYED RELEASE (ENTERIC COATED) ORAL
Qty: 10 TABLET | Refills: 0 | Status: SHIPPED | OUTPATIENT
Start: 2022-10-28

## 2022-10-28 RX ADMIN — SODIUM CHLORIDE, PRESERVATIVE FREE 10 ML: 5 INJECTION INTRAVENOUS at 05:57

## 2022-10-28 RX ADMIN — BISACODYL 10 MG: 5 TABLET, COATED ORAL at 09:35

## 2022-10-28 RX ADMIN — OXYCODONE HYDROCHLORIDE AND ACETAMINOPHEN 2 TABLET: 5; 325 TABLET ORAL at 13:06

## 2022-10-28 RX ADMIN — OXYCODONE HYDROCHLORIDE AND ACETAMINOPHEN 1 TABLET: 5; 325 TABLET ORAL at 06:17

## 2022-10-28 RX ADMIN — SODIUM CHLORIDE, PRESERVATIVE FREE 10 ML: 5 INJECTION INTRAVENOUS at 13:09

## 2022-10-28 RX ADMIN — MORPHINE SULFATE 4 MG: 4 INJECTION INTRAVENOUS at 01:00

## 2022-10-28 RX ADMIN — SODIUM CHLORIDE, PRESERVATIVE FREE 10 ML: 5 INJECTION INTRAVENOUS at 13:08

## 2022-10-28 RX ADMIN — HEPARIN SODIUM 5000 UNITS: 5000 INJECTION INTRAVENOUS; SUBCUTANEOUS at 13:07

## 2022-10-28 RX ADMIN — HEPARIN SODIUM 5000 UNITS: 5000 INJECTION INTRAVENOUS; SUBCUTANEOUS at 01:01

## 2022-10-28 NOTE — PROGRESS NOTES
[facility]         CXR with possible tiny pocket of air left lower lateral chest.  Will repeat PA-LAT at 3 pm, then home if stable findings

## 2022-10-28 NOTE — ROUTINE PROCESS
Wound Prevention Checklist    Patient: Felisha Metzger (31 y.o. male)  Date: 10/27/2022  Diagnosis: Pneumothorax [J93.9] <principal problem not specified>    Precautions:         []  Heel prevention boots placed on patient    []  Patient turned q2h during shift    []  Lift team ordered    []  Patient on [place] bed/Specialty bed    [x]  Each Wound is documented during shift (Stage, Color, drainage, odor, measurements, and dressings)    Patient has a chest tube. Area where the chest tube is placed has a dry dressing.      [x]  Dual skin check done with KAVEH Lombardo RN

## 2022-10-28 NOTE — PROGRESS NOTES
Bedside and Verbal shift change report given to Jerri Burk RN (oncoming nurse) by Johana Delgado RN (offgoing nurse). Report included the following information SBAR, Kardex, ED Summary, Procedure Summary, Intake/Output, MAR, Recent Results, Med Rec Status, and Cardiac Rhythm NSR . Wound Prevention Checklist    Patient: Feilsha Metzger (31 y.o. male)  Date: 10/27/2022  Diagnosis: Pneumothorax [J93.9] <principal problem not specified>    Precautions:         []  Heel prevention boots placed on patient    []  Patient turned q2h during shift    []  Lift team ordered    []  Patient on [place] bed/Specialty bed    []  Each Wound is documented during shift (Stage, Color, drainage, odor, measurements, and dressings)    []  Dual skin check done with KAVEH Brito RN      2015: Pt AOX4, pain scale 6/10, on room air, VSS, chest tube in place, no air leak noted, shift assessment completed, bed locked and low position, call bell within reach    2124: Assisted pt to bedside commode and back to bed comfortably, Pt talking on the phone, no c/o voiced    2229: Transferred pt to room 2312, hooked on CVT 2 tele monitor    0009: Pt sleeping, no distress noted, no change from previous assessment    0100: Pt c/o pain, scale 9/10, Morphine 4 mg IV administered per pt request    0410: Pt sleeping, VSS, no distress noted, no change from previous assessment    0617: Medicated pt for pain - SEE MAR, Partial CHG bath provided    Bedside and Verbal shift change report given to Aislinn Veliz RN (oncoming nurse) by Jerri Burk RN (offgoing nurse). Report included the following information SBAR, Kardex, Intake/Output, MAR, Recent Results, Med Rec Status, and Cardiac Rhythm NSR .

## 2022-10-28 NOTE — ROUTINE PROCESS
SBAR report received from Serge Zapata. RN to Gretel SEALS RN. Patient transferred from CVT ICU to CVT SD.     1815: Patient arrived to unit. Patient attcahed to monitor. Patient has stable vitals and skin assessment performed. No wounds noted. Patient has a chest tube in the left chest. Chest tube drainage at 164mL. SBAR report given by Jaja Decker., RN offgoing nurse to Matheus Orr., RN oncming nurse.

## 2022-10-28 NOTE — PROGRESS NOTES
Good RX card given to pt. Pt does not have a pcp. CM sent message to Case Management office to assist with finding a pcp for pt.             CHELO Schmidt RN  Care Management  Pager: [phone]

## 2022-10-28 NOTE — DISCHARGE SUMMARY
[facility]  Discharge Summary       Patient: Page Headings Age: 33 y.o. : 1995 MR#: [id number]: [id number]  PCP on record: None  Admit date: 10/22/2022  Discharge date: 10/28/2022    Consults:  -DARRICK Pickering,-[facility]  -Mr Luigi Saeed., PA-cardiothoracic surgery  -   Procedures: on 10/26/22:  1.) Left Lung Upper Lobe VATS Bleb Resection  2.) Left VATS Mechanical Pleurodesis  3.) Left VATS Mechanical Pleurectomy  4.) Complete Left Intercostal Nerve Block - Exparel Injection  5.) Left Chest VATS [facility] Local Anesthesia - Exparel Injection  -     Significant Diagnostic Studies: CT Results (most recent):  Results from Hospital Encounter encounter on 10/22/22    CT CHEST WO CONT    Narrative  EXAM: CT CHEST WITHOUT CONTRAST. CLINICAL HISTORY/INDICATION:  34 yo man with no past medical history but is a  smoker had acute onset left sided pneumothorax now s/p chest tube. Please  evaluate with CT scan for any anatomic/parenchymal abnormality that may have  contributed to PTX.    COMPARISON: Chest x-ray 10/22, 10/23/2022. TECHNIQUE: Standard helical images were obtained from the thoracic inlet through  the adrenals at 5 mm thick sections without intravenous contrast.  Coronal and sagittal reformations obtained. Images were reviewed on both soft  tissue, lung, and bone window settings. All CT scans at this facility are performed using dose optimization technique as  appropriate to a performed exam, to include automated exposure control,  adjustment of the mA and/or kV according to patient's size (including  appropriate matching for site-specific examinations), or use of iterative  reconstruction technique. FINDINGS:    2 small bulla at the left lung apex medially. The larger measures 2.7 x 2.1 x  3.8 cm. Axial image 11, coronal image 38. Subsegmental atelectasis at the left medial posterior lower lobe.  Minimal  dependent atelectasis at the right base.    Very small residual pneumothorax measuring less than 1 cm. Left thoracostomy tube extends ventrally and superiorly to terminate at the  medial pleural space just anterior to the aortic arch. There is no evidence of mediastinal, hilar, nor axillary adenopathy. Evaluation of the mediastinum and karmen is limited by the lack of IV contrast.  The great vessels and thoracic aorta are unremarkable. There are no pleural effusions. Small amount of subcutaneous emphysema along the left chest wall. The included portion of the of the liver is unremarkable. The adrenal glands are normal.    The chest wall soft tissues are unremarkable. The bony structures are unremarkable. Impression  2 small left apical medial bulla/bleb. Subcentimeter residual left pneumothorax. Left chest tube in position. Minimal medial left lower lobe and right posterior costophrenic gutter  subsegmental atelectasis. -  XR Results (most recent):  Results from Hospital Encounter encounter on 10/22/22    XR CHEST PA LAT    Narrative  CHEST PORTABLE    COMPARISON: 28 October. INDICATIONS: Follow-up chest tube removal.    FINDINGS:    Portable single view chest demonstrates:    Lungs: Clear. Cardiac Silhouette And Mediastinal Contours: Normal.    Pleural Spaces: A trace of left pleural air is evident in the axillary region. Bones And Soft Tissues: Unremarkable for age. Impression  A trace of pneumothorax is seen in the axillary region. .  Otherwise negative      Discharge Diagnoses: -Spontaneous left pnumothorax  -Tobacco abuse  -Leukocytosis                                          Patient Active Problem List   Diagnosis Code    Pneumothorax J93.9    Chest pain R07.9    S/P thoracotomy Z98.890       Hospital Course by Problem   HPI : Patient with history of tobacco abuse marijuana use vaping, admitted with tension pneumothorax s/p chest tube     -Spontaneous pneumothorax left side, had persistent lung air leak, persistent left PTX despite chest tube, seen by CTS and underwent VATS w/ blebectomy and mechanical pleurodesis on 10/26. Chest tubes were subsequently pulled out by CTS and he had follow up x rays. After review of follow up CXRs by CTS, pt was cleared to go home. Discharged in stable condition. - tobacco abuse-counseled regarding smoking cessation  -UDS positive THC  -Leukocytosis , w/o other signs/symptoms of SIRS/sepsis, wbc trended down to normal prior to dc. Today's examination of the patient revealed:     Subjective:   Pt had no complaints. Wants to go home.   Objective:   VS: Visit Vitals  /65   Pulse 68   Temp 98 °F (36.7 °C)   Resp 16   Ht 5' 11\" (1.803 m)   Wt 71.9 kg (158 lb 8 oz)   SpO2 97%   BMI 22.11 kg/m²      Tmax/24hrs: Temp (24hrs), Av °F (32.2 °C), Min:33.8 °F (1 °C), Max:98.5 °F (36.9 °C)     Input/Output:   Intake/Output Summary (Last 24 hours) at 10/28/2022 1543  Last data filed at 10/28/2022 1339  Gross per 24 hour   Intake 1280 ml   Output 2988 ml   Net -1708 ml       General:  Alert, cooperative, no acute distress   HEENT: NCAT   Cardiovascular: S1S2 - regular , No Murmur   Pulmonary: Equal expansion , No Use of accessory muscles , No Rales No Rhonchi    GI:  +BS in all four quadrants, soft, non-tender  Extremities:  No edema; 2+ dorsalis pedis pulses bilaterally   Additional:      Labs:    Recent Results (from the past 24 hour(s))   GLUCOSE, POC    Collection Time: 10/27/22 11:20 PM   Result Value Ref Range    Glucose (POC) 102 70 - 110 mg/dL   CBC WITH AUTOMATED DIFF    Collection Time: 10/28/22  6:10 AM   Result Value Ref Range    WBC 10.6 4.6 - 13.2 K/uL    RBC 5.65 4.35 - 5.65 M/uL    HGB 13.2 13.0 - 16.0 g/dL    HCT 41.1 36.0 - 48.0 %    MCV 72.7 (L) 78.0 - 100.0 FL    MCH 23.4 (L) 24.0 - 34.0 PG    MCHC 32.1 31.0 - 37.0 g/dL    RDW 14.6 (H) 11.6 - 14.5 %    PLATELET 010 649 - 387 K/uL    MPV 10.8 9.2 - 11.8 FL    NRBC 0.0 0  WBC    ABSOLUTE NRBC 0.00 0.00 - 0.01 K/uL    NEUTROPHILS 64 40 - 73 %    LYMPHOCYTES 21 21 - 52 %    MONOCYTES 11 (H) 3 - 10 %    EOSINOPHILS 4 0 - 5 %    BASOPHILS 0 0 - 2 %    IMMATURE GRANULOCYTES 0 0.0 - 0.5 %    ABS. NEUTROPHILS 6.9 1.8 - 8.0 K/UL    ABS. LYMPHOCYTES 2.2 0.9 - 3.6 K/UL    ABS. MONOCYTES 1.1 0.05 - 1.2 K/UL    ABS. EOSINOPHILS 0.4 0.0 - 0.4 K/UL    ABS. BASOPHILS 0.0 0.0 - 0.1 K/UL    ABS. IMM. GRANS. 0.0 0.00 - 0.04 K/UL    DF AUTOMATED       Additional Data Reviewed:     Condition on discharge:stable   Disposition:    [x]Home   []Home with Home Health   []SNF/NH   []Rehab   []Home with family   []Alternate Facility:____________________      Discharge Medications:     Current Discharge Medication List        START taking these medications    Details   acetaminophen (TYLENOL) 500 mg tablet Take 1 Tablet by mouth every six (6) hours as needed for Pain or Fever. Indications: pain  Qty: 90 Tablet, Refills: 0      bisacodyL (DULCOLAX) 5 mg EC tablet Take 2 Tablets by mouth daily as needed for Constipation. Indications: constipation  Qty: 10 Tablet, Refills: 0      oxyCODONE-acetaminophen (PERCOCET) 5-325 mg per tablet Take 1 Tablet by mouth every six (6) hours as needed for Pain for up to 3 days. Max Daily Amount: 4 Tablets. Qty: 12 Tablet, Refills: 0    Associated Diagnoses: S/P thoracotomy               Follow-up Appointments:   1.  Your PCP: None, within 7-10days    >30 minutes spent coordinating this discharge (review instructions/follow-up, prescriptions, preparing report for sign off)    Signed:  Lanny Parson MD  10/28/2022  3:43 PM

## 2022-10-28 NOTE — PROGRESS NOTES
CARDIOTHORACIC SURGERY PROGRESS NOTE    10/28/2022  12:04 PM     CC:  Post Operative Day # 2     Chart reviewed. Rounded with Dr. Walter Nieto    Interval History/Events of Past 24 hours:   Remaining tube drained 88 mm/24 hours. No air leak on suction. CXR without effusion or ptx    Subjective:  Patient seen and examined on rounds today. Chief Complaint:  none  Pain level: controlled  Ambulating:  well   Sleeping:  well  Eating:   well    BM: No    Objective:  Vital signs:   Visit Vitals  /65   Pulse 68   Temp 98 °F (36.7 °C)   Resp 16   Ht 5' 11\" (1.803 m)   Wt 71.9 kg (158 lb 8 oz)   SpO2 97%   BMI 22.11 kg/m²     Temp (24hrs), Av °F (32.2 °C), Min:33.8 °F (1 °C), Max:98.5 °F (36.9 °C)    Admission Weight: Last Weight   Weight: 77.1 kg (170 lb) Weight: 71.9 kg (158 lb 8 oz)     Physical Examination:     General:  Alert, oriented   Lungs: Clear to ascultation without rales, wheezes or rhonchi. Inspiratory effort good. Chest:  Dressings clean and dry. Incisions healing well. No SC air. Heart:  RRR without rub or murmur. Abdomen: Active sounds. Soft and non-tender without masses. Extremities:  Edema not present. Warm and well perfused. Neuro: No deficit. Labs:  Lab Results   Component Value Date/Time    WBC 10.6 10/28/2022 06:10 AM    HCT 41.1 10/28/2022 06:10 AM     10/28/2022 06:10 AM      Lab Results   Component Value Date/Time     10/25/2022 04:20 AM    K 3.8 10/25/2022 04:20 AM     10/25/2022 04:20 AM    CO2 29 10/25/2022 04:20 AM    GLU 88 10/25/2022 04:20 AM    BUN 9 10/25/2022 04:20 AM    CREA 0.96 10/25/2022 04:20 AM    CREA 0.92 10/24/2022 04:36 AM    CREA 1.06 10/23/2022 05:25 AM     Assessment:  S/P  Left VATS thoracotomy for blebectomy  Respiratory parameters stable  Cardiac status stable     Plans:  1. Chest tube removed without difficulty. Remove bandages in 4 days. Supplies provided. 2.  Follow up CXR, then home if stable findings.   3.  Follow up with our service in 10-14 days  4. Home today with indigent meds. Pain management discussed      Deborah Underwood PA-C    PLEASE NOTE:  This document has been produced using voice recognition software. Unrecognized errors in transcription may be present. NOTE TO PATIENT:  The purpose of this note is to communicate optimally with the other providers involved in your care. It is written using standard medical terminology. If you have questions regarding details of the note please call my office at [phone] and make an appointment to discuss your concerns.

## 2022-10-28 NOTE — PROGRESS NOTES
Problem: Falls - Risk of  Goal: *Absence of Falls  Description: Document [place] Fall Risk and appropriate interventions in the flowsheet.   Outcome: Progressing Towards Goal  Note: Fall Risk Interventions:  Mobility Interventions: Assess mobility with egress test, Bed/chair exit alarm, Patient to call before getting OOB, Utilize walker, cane, or other assistive device    Mentation Interventions: Adequate sleep, hydration, pain control, Bed/chair exit alarm, Door open when patient unattended, Increase mobility, Room close to nurse's station    Medication Interventions: Assess postural VS orthostatic hypotension, Bed/chair exit alarm, Evaluate medications/consider consulting pharmacy, Patient to call before getting OOB, Teach patient to arise slowly    Elimination Interventions: Bed/chair exit alarm, Call light in reach, Patient to call for help with toileting needs, Stay With Me (per policy), Urinal in reach    History of Falls Interventions: Bed/chair exit alarm, Consult care management for discharge planning, Door open when patient unattended, Investigate reason for fall, Evaluate medications/consider consulting pharmacy, Room close to nurse's station         Problem: Patient Education: Go to Patient Education Activity  Goal: Patient/Family Education  Outcome: Progressing Towards Goal     Problem: Pain  Goal: *Control of Pain  Outcome: Progressing Towards Goal

## 2022-10-28 NOTE — CONSULTS
[facility]  Pulmonary, Critical Care, and Sleep Medicine    Name: Odette Estrada MRN: [id number]   : 1995 Hospital: [facility]   Date: 10/28/2022        Pulmonary Follow-up In-Patient Consult                                              Consult requesting physician: Dr. Sg Tijerina  Reason for Consult: Spontaneous pneumothorax    IMPRESSION:   Spontaneous pneumothorax on left side now resolved s/p VATS with blebectomy and mechanical pleurodesis on 10/26  Tobacco abuse  Microcytosis: thalassemia versus iron deficiency  THC + on drug screen        RECOMMENDATIONS:   Chest tube management per CT surgery  Repeat chest CT  Daily CXR  Titrate O2 to maintain sats > 92%. Pain management per primary team  Nutrition: per primary team  Replace electrolytes  HOB >=30 degree, aggressive pulmonary toileting, incentive spirometry, PT/OT eval and treat  GI Prophylaxis: per primary team  not indicated  DVT Prophylaxis: SCDs recommended   Smoking and THC cessation counseling done by me and spent >11 minutes on it. Will follow     Subjective/History:     Odette Estrada is a 33 y.o. male with PMHx significant for tobacco use who had sudden onset this morning of left sided chest pain and dyspnea when we awoke from sleep. No previous ill contacts, other symptoms or trauma. Upon presentation to ED, left sided pneumothorax diagnosed and chest tube placed on left side without complication and noted to have full lung re-expansion. Currently has moderate left sided pain and has no dyspnea on 4 L/min of supplemental O2.      10/28/2022    S/P VATs with Blebectomy and mechanical pleurodesis 10/26  One CT removed yesterday  No air leak in pleurovac chamber; drainage is 88 ml over last 24 hours    Review of Systems:  A comprehensive review of systems was negative except for that written in the HPI. No Known Allergies     History reviewed. No pertinent past medical history. History reviewed.  No pertinent surgical history.      Family History   Problem Relation Age of Onset    Cancer Father         Social History     Tobacco Use    Smoking status: Every Day     Types: Cigarettes    Smokeless tobacco: Never   Substance Use Topics    Alcohol use: Never      Prior to Admission medications    Not on File       Current Facility-Administered Medications   Medication Dose Route Frequency    sodium chloride (NS) flush 5-40 mL  5-40 mL IntraVENous Q8H    sodium chloride (NS) flush 5-40 mL  5-40 mL IntraVENous PRN    oxyCODONE-acetaminophen (PERCOCET) 5-325 mg per tablet 1-2 Tablet  1-2 Tablet Oral Q4H PRN    naloxone (NARCAN) injection 0.4 mg  0.4 mg IntraVENous PRN    ondansetron (ZOFRAN) injection 4 mg  4 mg IntraVENous Q6H PRN    bisacodyL (DULCOLAX) tablet 10 mg  10 mg Oral DAILY    heparin (porcine) injection 5,000 Units  5,000 Units SubCUTAneous Q12H    acetaminophen (TYLENOL) tablet 650 mg  650 mg Oral Q6H PRN    morphine injection 4 mg  4 mg IntraVENous Q3H PRN    0.9% sodium chloride infusion 250 mL  250 mL IntraVENous PRN    lidocaine 4 % patch 2 Patch  2 Patch TransDERmal Q24H    sodium chloride (NS) flush 5-40 mL  5-40 mL IntraVENous Q8H    sodium chloride (NS) flush 5-40 mL  5-40 mL IntraVENous PRN    acetaminophen (TYLENOL) suppository 650 mg  650 mg Rectal Q6H PRN    polyethylene glycol (MIRALAX) packet 17 g  17 g Oral DAILY PRN    promethazine (PHENERGAN) tablet 12.5 mg  12.5 mg Oral Q6H PRN    famotidine (PEPCID) tablet 20 mg  20 mg Oral QPM    albuterol-ipratropium (DUO-NEB) 2.5 MG-0.5 MG/3 ML  3 mL Nebulization Q6H PRN         Objective:   Vital Signs:    Visit Vitals  /65   Pulse 68   Temp 98 °F (36.7 °C)   Resp 16   Ht 5' 11\" (1.803 m)   Wt 71.9 kg (158 lb 8 oz)   SpO2 97%   BMI 22.11 kg/m²       O2 Device: None (Room air)   O2 Flow Rate (L/min): 2 l/min   Temp (24hrs), Av °F (32.8 °C), Min:33.8 °F (1 °C), Max:98.7 °F (37.1 °C)       Intake/Output:   Last shift:      10/28 0701 - 10/28 1900  In: 240 [P.O.:240]  Out: 1000 [Urine:1000]  Last 3 shifts: 10/26 1901 - 10/28 0700  In: 1766.3 [P.O.:1385; I.V.:381.3]  Out: 4210 [Urine:3980]    Intake/Output Summary (Last 24 hours) at 10/28/2022 1154  Last data filed at 10/28/2022 1136  Gross per 24 hour   Intake 1240 ml   Output 3396 ml   Net -2156 ml         Physical Exam:     General:  Alert, Awake, NAD, cooperative, no distress, appears stated age. Head:   Normocephalic, without obvious abnormality, atraumatic. Eye:   Conjunctivae/corneas clear. PERRLA, no scleral icterus, no pallor, no cyanosis  Nose:   Nares normal. Septum midline. Mucosa normal without erythema/exudate. No drainage/discharge. No sinus tenderness. Throat:  Lips, mucosa, and tongue normal. Teeth and gums normal. No tonsillar enlargement, no erythema, no exudates, no oral thrush  Neck:   Supple, symmetric, thyroid: no enlargement/tenderness/nodule, no JVD, no carotid bruit, no lymphadenopathy. Trachea midline  Back & spine: Symmetric, no curvature. Chest wall: Chest tube in place left thorax  Lung:   Adequate air entry bilateral equal, no rales, no rhonchi, no wheezing. No dullness on percussion. Heart:   Regular rate & rhythm. S1 S2 present, no murmur, no gallop, no click, no rub  Abdomen:  Soft, NT, ND, +BS, no masses, no organomegaly  Extremities:  No pedal edema, no cyanosis, no clubbing  Pulses: 2+ and symmetric in DP  Lymphatic:  No cervical, supraclavicular and axillary palpable lymphadenopathy. Musculoskeletal: No joint swelling or tenderness. Neurologic:  Grossly non focal.        Data:         Chemistry No results for input(s): GLU, NA, K, CL, CO2, BUN, CREA, CA, MG, PHOS, AGAP, BUCR, TBIL, AP, TP, ALB, GLOB, AGRAT in the last 72 hours. No lab exists for component: GPT       Lactic Acid No results found for: LAC  No results for input(s): LAC in the last 72 hours.      Liver Enzymes Protein, total   Date Value Ref Range Status   10/22/2022 7.3 6.4 - 8.2 g/dL Final Albumin   Date Value Ref Range Status   10/22/2022 4.1 3.4 - 5.0 g/dL Final     Globulin   Date Value Ref Range Status   10/22/2022 3.2 2.0 - 4.0 g/dL Final     A-G Ratio   Date Value Ref Range Status   10/22/2022 1.3 0.8 - 1.7   Final     Alk. phosphatase   Date Value Ref Range Status   10/22/2022 60 45 - 117 U/L Final     No results for input(s): TP, ALB, GLOB, AGRAT, AP, TBIL in the last 72 hours. No lab exists for component: SGOT, GPT, DBIL       CBC w/Diff Recent Labs     10/28/22  0610 10/27/22  0510 10/26/22  1141   WBC 10.6 17.0* 19.6*   RBC 5.65 5.76* 6.45*   HGB 13.2 13.3 15.2   HCT 41.1 41.0 47.7    268 328   GRANS 64 78*  --    LYMPH 21 14*  --    EOS 4 1  --           Cardiac Enzymes No results found for: CPK, CK, CKMMB, CKMB, RCK3, CKMBT, CKNDX, CKND1, RAMBO, TROPT, TROIQ, BINTA, TROPT, TNIPOC, BNP, BNPP     BNP No results found for: BNP, BNPP, XBNPT     Coagulation Recent Labs     10/25/22  1309   PTP 13.1   INR 1.0   APTT 30.5           Thyroid  No results found for: T4, T3U, TSH, TSHEXT, TSHEXT       Lipid Panel No results found for: CHOL, CHOLPOCT, CHOLX, CHLST, CHOLV, 838697, HDL, HDLP, LDL, LDLC, DLDLP, 823202, VLDLC, VLDL, TGLX, TRIGL, TRIGP, TGLPOCT, CHHD, CHHDX     ABG No results for input(s): PHI, PHI, POC2, PCO2I, PO2, PO2I, HCO3, HCO3I, FIO2, FIO2I in the last 72 hours. Urinalysis Lab Results   Component Value Date/Time    Color YELLOW 10/25/2022 02:47 PM    Appearance CLEAR 10/25/2022 02:47 PM    Specific gravity 1.012 10/25/2022 02:47 PM    pH (UA) 8.0 10/25/2022 02:47 PM    Protein Negative 10/25/2022 02:47 PM    Glucose Negative 10/25/2022 02:47 PM    Ketone Negative 10/25/2022 02:47 PM    Bilirubin Negative 10/25/2022 02:47 PM    Urobilinogen 1.0 10/25/2022 02:47 PM    Nitrites Negative 10/25/2022 02:47 PM    Leukocyte Esterase Negative 10/25/2022 02:47 PM        Micro  No results for input(s): SDES, CULT in the last 72 hours.   No results for input(s): CULT in the last 72 hours. XR (Most Recent). CXR reviewed by me and compared with previous CXR Results from Hospital Encounter encounter on 10/22/22    XR CHEST PORT    Narrative  Portable CXR:    HISTORY: Post surgery. COMPARISON: 0536 hours    No left pneumothorax. Additional left chest tube placement, for a total of 2. Mild vascular congestion in the right lung. Atelectasis/infiltrate in the right  lung base. Cardiac silhouette is normal in size. Impression  As described       CT (Most Recent) Results from Hospital Encounter encounter on 10/22/22    CT CHEST WO CONT    Narrative  EXAM: CT CHEST WITHOUT CONTRAST. CLINICAL HISTORY/INDICATION:  34 yo man with no past medical history but is a  smoker had acute onset left sided pneumothorax now s/p chest tube. Please  evaluate with CT scan for any anatomic/parenchymal abnormality that may have  contributed to PTX.    COMPARISON: Chest x-ray 10/22, 10/23/2022. TECHNIQUE: Standard helical images were obtained from the thoracic inlet through  the adrenals at 5 mm thick sections without intravenous contrast.  Coronal and sagittal reformations obtained. Images were reviewed on both soft  tissue, lung, and bone window settings. All CT scans at this facility are performed using dose optimization technique as  appropriate to a performed exam, to include automated exposure control,  adjustment of the mA and/or kV according to patient's size (including  appropriate matching for site-specific examinations), or use of iterative  reconstruction technique. FINDINGS:    2 small bulla at the left lung apex medially. The larger measures 2.7 x 2.1 x  3.8 cm. Axial image 11, coronal image 38. Subsegmental atelectasis at the left medial posterior lower lobe. Minimal  dependent atelectasis at the right base. Very small residual pneumothorax measuring less than 1 cm.   Left thoracostomy tube extends ventrally and superiorly to terminate at the  medial pleural space just anterior to the aortic arch. There is no evidence of mediastinal, hilar, nor axillary adenopathy. Evaluation of the mediastinum and karmen is limited by the lack of IV contrast.  The great vessels and thoracic aorta are unremarkable. There are no pleural effusions. Small amount of subcutaneous emphysema along the left chest wall. The included portion of the of the liver is unremarkable. The adrenal glands are normal.    The chest wall soft tissues are unremarkable. The bony structures are unremarkable. Impression  2 small left apical medial bulla/bleb. Subcentimeter residual left pneumothorax. Left chest tube in position. Minimal medial left lower lobe and right posterior costophrenic gutter  subsegmental atelectasis. EKG No results found for this or any previous visit. ECHO No results found for this or any previous visit. PFT No flowsheet data found. Other ASA reactivity:   Pre-albumin:   Ionized Calcium:   NH4:   T3, FT4:  Cortisol:  Urine Osm:  Urine Lytes:   HbA1c:      Recent Results (from the past 24 hour(s))   GLUCOSE, POC    Collection Time: 10/27/22 11:20 PM   Result Value Ref Range    Glucose (POC) 102 70 - 110 mg/dL   CBC WITH AUTOMATED DIFF    Collection Time: 10/28/22  6:10 AM   Result Value Ref Range    WBC 10.6 4.6 - 13.2 K/uL    RBC 5.65 4.35 - 5.65 M/uL    HGB 13.2 13.0 - 16.0 g/dL    HCT 41.1 36.0 - 48.0 %    MCV 72.7 (L) 78.0 - 100.0 FL    MCH 23.4 (L) 24.0 - 34.0 PG    MCHC 32.1 31.0 - 37.0 g/dL    RDW 14.6 (H) 11.6 - 14.5 %    PLATELET 442 681 - 847 K/uL    MPV 10.8 9.2 - 11.8 FL    NRBC 0.0 0  WBC    ABSOLUTE NRBC 0.00 0.00 - 0.01 K/uL    NEUTROPHILS 64 40 - 73 %    LYMPHOCYTES 21 21 - 52 %    MONOCYTES 11 (H) 3 - 10 %    EOSINOPHILS 4 0 - 5 %    BASOPHILS 0 0 - 2 %    IMMATURE GRANULOCYTES 0 0.0 - 0.5 %    ABS. NEUTROPHILS 6.9 1.8 - 8.0 K/UL    ABS. LYMPHOCYTES 2.2 0.9 - 3.6 K/UL    ABS. MONOCYTES 1.1 0.05 - 1.2 K/UL    ABS.  EOSINOPHILS 0.4 0.0 - 0.4 K/UL    ABS. BASOPHILS 0.0 0.0 - 0.1 K/UL    ABS. IMM. GRANS. 0.0 0.00 - 0.04 K/UL    DF AUTOMATED           Telemetry:normal sinus rhythm    The patient is: [x] acutely ill Risk of deterioration: [x] moderate    [] critically ill  [] high     [x]See my orders for details    My assessment, plan of care, findings, medications, side effects etc were discussed with:  [] Nurse [] PT/OT    [] Respiratory therapy [x] Dr. Zay Beltran   [] Family: answered all questions to satisfaction [x] Patient: answered all questions to satisfaction   [] Pharmacist []      [x] Total critical care time exclusive of procedures 30 minutes with complex decision making, coordination of care and counseling patient performed and > 50% time spent in face to face evaluation.       Abena Foreman MD  10/28/2022

## 2022-10-29 LAB
ABO + RH BLD: NORMAL
BLD PROD TYP BPU: NORMAL
BLD PROD TYP BPU: NORMAL
BLOOD GROUP ANTIBODIES SERPL: NORMAL
BPU ID: NORMAL
BPU ID: NORMAL
CALLED TO:,BCALL1: NORMAL
CROSSMATCH RESULT,%XM: NORMAL
CROSSMATCH RESULT,%XM: NORMAL
SPECIMEN EXP DATE BLD: NORMAL
STATUS OF UNIT,%ST: NORMAL
STATUS OF UNIT,%ST: NORMAL
UNIT DIVISION, %UDIV: 0
UNIT DIVISION, %UDIV: 0

## 2022-10-29 NOTE — ROUTINE PROCESS
SBAR report was given by Ursula James, off going nurse to Aislinn EDWARDS RN on coming nurse. Patient has had a good day with no major concerns. Medicated x1 for pain after last chest tuube was removed to left chest wall. A gauze dressing was applied to the site and covered with Tegaderm dressing. Patient had a follow up Xray post chest tube removal. Patient was cleared for discharged to home. Prior to discharge patient was visited by outpatient pharmacy and medications were paid for at bedside. Patient was educated on taking to much Tylenol and be mindful that percocet has Tylenol in each tablet. Patient was also prescribed Tylenol 500mg. Patient verbalizes understanding. Patient discharged to home with Malvin Durant

## 2022-10-29 NOTE — ROUTINE PROCESS
Wound Prevention Checklist    Patient: Leonor Joiner (31 y.o. male)  Date: 10/28/2022  Diagnosis: Pneumothorax [J93.9] <principal problem not specified>    Precautions:         []  Heel prevention boots placed on patient    [x]  Patient turned q2h during shift  Patient is able to reposition and is OOB to chair frequently in room. []  Lift team ordered    []  Patient on Osvaldo bed/Specialty bed    [x]  Each Wound is documented during shift (Stage, Color, drainage, odor, measurements, and dressings)  Chest tube puncture sites. Sites are appropriate and healing well. .Covered with gauze dressings. [x]  Dual skin check done with  Jong ANG RN    No pressure injuries.    Aislinn Veliz RN

## 2022-11-07 ENCOUNTER — OFFICE VISIT (OUTPATIENT)
Dept: CARDIOTHORACIC SURGERY | Age: 27
End: 2022-11-07

## 2022-11-07 ENCOUNTER — OFFICE VISIT (OUTPATIENT)
Dept: FAMILY MEDICINE CLINIC | Facility: CLINIC | Age: 27
End: 2022-11-07

## 2022-11-07 ENCOUNTER — HOSPITAL ENCOUNTER (OUTPATIENT)
Dept: GENERAL RADIOLOGY | Age: 27
Discharge: HOME | End: 2022-11-07
Payer: MEDICAID

## 2022-11-07 VITALS
TEMPERATURE: 97 F | HEIGHT: 71 IN | RESPIRATION RATE: 16 BRPM | BODY MASS INDEX: 22.96 KG/M2 | WEIGHT: 164 LBS | SYSTOLIC BLOOD PRESSURE: 126 MMHG | HEART RATE: 88 BPM | DIASTOLIC BLOOD PRESSURE: 73 MMHG | OXYGEN SATURATION: 100 %

## 2022-11-07 VITALS
BODY MASS INDEX: 23.19 KG/M2 | DIASTOLIC BLOOD PRESSURE: 62 MMHG | SYSTOLIC BLOOD PRESSURE: 112 MMHG | HEART RATE: 59 BPM | TEMPERATURE: 98.9 F | OXYGEN SATURATION: 97 % | HEIGHT: 70 IN | WEIGHT: 162 LBS

## 2022-11-07 DIAGNOSIS — Z23 NEEDS FLU SHOT: Primary | ICD-10-CM

## 2022-11-07 DIAGNOSIS — Z98.890 S/P THORACOTOMY: Primary | ICD-10-CM

## 2022-11-07 DIAGNOSIS — Z98.890 S/P THORACOTOMY: ICD-10-CM

## 2022-11-07 DIAGNOSIS — J93.11 PRIMARY SPONTANEOUS PNEUMOTHORAX: ICD-10-CM

## 2022-11-07 PROCEDURE — 99024 POSTOP FOLLOW-UP VISIT: CPT | Performed by: PHYSICIAN ASSISTANT

## 2022-11-07 PROCEDURE — 71046 X-RAY EXAM CHEST 2 VIEWS: CPT

## 2022-11-07 PROCEDURE — 90686 IIV4 VACC NO PRSV 0.5 ML IM: CPT | Performed by: FAMILY MEDICINE

## 2022-11-07 PROCEDURE — 99202 OFFICE O/P NEW SF 15 MIN: CPT | Performed by: FAMILY MEDICINE

## 2022-11-07 PROCEDURE — 90471 IMMUNIZATION ADMIN: CPT | Performed by: FAMILY MEDICINE

## 2022-11-07 NOTE — PATIENT INSTRUCTIONS
Vaccine Information Statement    Influenza (Flu) Vaccine (Inactivated or Recombinant): What You Need to Know    Many vaccine information statements are available in [place]ian and other languages. See www.immunize.org/vis. Hojas de información sobre vacunas están disponibles en español y en muchos otros idiomas. Visite www.immunize.org/vis. 1. Why get vaccinated? Influenza vaccine can prevent influenza (flu). Flu is a contagious disease that spreads around the [facility] every year, usually between October and May. Anyone can get the flu, but it is more dangerous for some people. Infants and young children, people 72 years and older, pregnant people, and people with certain health conditions or a weakened immune system are at greatest risk of flu complications. Pneumonia, bronchitis, sinus infections, and ear infections are examples of flu-related complications. If you have a medical condition, such as heart disease, cancer, or diabetes, flu can make it worse. Flu can cause fever and chills, sore throat, muscle aches, fatigue, cough, headache, and runny or stuffy nose. Some people may have vomiting and diarrhea, though this is more common in children than adults. In an average year, thousands of people in the [facility] die from flu, and many more are hospitalized. Flu vaccine prevents millions of illnesses and flu-related visits to the doctor each year. 2. Influenza vaccines     CDC recommends everyone 6 months and older get vaccinated every flu season. Children 6 months through 6years of age may need 2 doses during a single flu season. Everyone else needs only 1 dose each flu season. It takes about 2 weeks for protection to develop after vaccination. There are many flu viruses, and they are always changing. Each year a new flu vaccine is made to protect against the influenza viruses believed to be likely to cause disease in the upcoming flu season.  Even when the vaccine doesnt exactly match these viruses, it may still provide some protection. Influenza vaccine does not cause flu. Influenza vaccine may be given at the same time as other vaccines. 3. Talk with your health care provider    Tell your vaccination provider if the person getting the vaccine:  Has had an allergic reaction after a previous dose of influenza vaccine, or has any severe, life-threatening allergies   Has ever had Guillain-Barré Syndrome (also called GBS)    In some cases, your health care provider may decide to postpone influenza vaccination until a future visit. Influenza vaccine can be administered at any time during pregnancy. People who are or will be pregnant during influenza season should receive inactivated influenza vaccine. People with minor illnesses, such as a cold, may be vaccinated. People who are moderately or severely ill should usually wait until they recover before getting influenza vaccine. Your health care provider can give you more information. 4. Risks of a vaccine reaction    Soreness, redness, and swelling where the shot is given, fever, muscle aches, and headache can happen after influenza vaccination. There may be a very small increased risk of Guillain-Barré Syndrome (GBS) after inactivated influenza vaccine (the flu shot). [facility] children who get the flu shot along with pneumococcal vaccine (PCV13) and/or DTaP vaccine at the same time might be slightly more likely to have a seizure caused by fever. Tell your health care provider if a child who is getting flu vaccine has ever had a seizure. People sometimes faint after medical procedures, including vaccination. Tell your provider if you feel dizzy or have vision changes or ringing in the ears. As with any medicine, there is a very remote chance of a vaccine causing a severe allergic reaction, other serious injury, or death. 5. What if there is a serious problem?     An allergic reaction could occur after the vaccinated person leaves the clinic. If you see signs of a severe allergic reaction (hives, swelling of the face and throat, difficulty breathing, a fast heartbeat, dizziness, or weakness), call 9-1-1 and get the person to the nearest hospital.    For other signs that concern you, call your health care provider. Adverse reactions should be reported to the Vaccine Adverse Event Reporting System (VAERS). Your health care provider will usually file this report, or you can do it yourself. Visit the VAERS website at www.vaers. [facility].gov or call 3-665.525.5682. VAERS is only for reporting reactions, and VAERS staff members do not give medical advice. 6. The National Vaccine Injury Compensation Program    The [facility] Vaccine Injury Compensation Program (VICP) is a federal program that was created to compensate people who may have been injured by certain vaccines. Claims regarding alleged injury or death due to vaccination have a time limit for filing, which may be as short as two years. Visit the VICP website at www.[facility].gov/vaccinecompensation or call 0-158.871.7176 to learn about the program and about filing a claim. 7. How can I learn more? Ask your health care provider. Call your local or state health department. Visit the website of the Food and Drug Administration (FDA) for vaccine package inserts and additional information at www.fda.gov/vaccines-blood-biologics/vaccines. Contact the Centers for Disease Control and Prevention (CDC): Call 5-136.248.3505 (1-800-CDC-INFO) or  Visit CDCs influenza website at www.cdc.gov/flu. Vaccine Information Statement   Inactivated Influenza Vaccine   8/6/2021  42 ZAIDA Baltazar [id number]   Department of Health and Human Services  Centers for Disease Control and Prevention    Office Use Only

## 2022-11-07 NOTE — PROGRESS NOTES
Gemma Monroe presents today for   Chief Complaint   Patient presents with    Post OP Follow Up     S/P VATS for blebectomy       Yudelka Rhodes preferred language for health care discussion is english/other.     Is someone accompanying this pt? no    Is the patient using any DME equipment during OV? none    Vitals:    11/07/22 1417   BP: 112/62   Pulse: (!) 59   Temp: 98.9 °F (37.2 °C)   TempSrc: Temporal   SpO2: 97%   Weight: 73.5 kg (162 lb)   Height: 5' 10\" (1.778 m)

## 2022-11-07 NOTE — PROGRESS NOTES
[facility]    Discharge instructions reviewed with patient    Medication list and understanding of medications reviewed with patient. OTC and herbal medications reviewed and added to med list if applicable  Barriers to adherence assessed. Guidance given regarding new medications this visit, including reason for taking this medicine, and common side effects. AVS given to patient. Explained to patient. Patient expressed understanding.

## 2022-11-07 NOTE — LETTER
NOTIFICATION RETURN TO WORK / SCHOOL    11/7/2022 2:38 PM    Mr. Mireille Woodward  [address] [place] [address] Road. [place] [address]      To Whom It May Concern:    Mireille Woodward had a surgical procedure on 10/26/2022 and is currently under the care of [address] He can return to work on: 11/14/2022 with limited duty, specifically no heavy lifting for 3-5 weeks. If there are questions or concerns please have the patient contact our office.         Sincerely,      Malu Bojorquez PA-C

## 2022-11-07 NOTE — PROGRESS NOTES
HPI  Soraya Barlow is a 33 y.o. male being seen today for   Chief Complaint   Patient presents with    Follow-up     Follow-up hospital   [facility] for this pt to care a Enrrique Senna.    he states that he had a spontaneous pneumothorax and was hosptialized requiring chest tube. Since hospital dc he has some numbness on chest around area of incision. Also some pain and a pulling sensation in chest with certain movements. Wanting to go back to work. Seeing cardiothoracic surgery today. Not sure if he will receive medicaid or need [facility] financial screening. History reviewed. No pertinent past medical history. ROS  Patient states that he is feeling well. Denies complaints of chest pain, shortness of breath, swelling of legs, dizziness or weakness. he denies nausea, vomiting or diarrhea. Current Outpatient Medications   Medication Sig    acetaminophen (TYLENOL) 500 mg tablet Take 1 Tablet by mouth every six (6) hours as needed for Pain or Fever. Indications: pain    bisacodyL (DULCOLAX) 5 mg EC tablet Take 2 Tablets by mouth daily as needed for Constipation. Indications: constipation (Patient not taking: No sig reported)     No current facility-administered medications for this visit. PE  Visit Vitals  /73 (BP 1 Location: Right arm, BP Patient Position: Sitting, BP Cuff Size: Adult long)   Pulse 88   Temp 97 °F (36.1 °C) (Temporal)   Resp 16   Ht 5' 11\" (1.803 m)   Wt 164 lb (74.4 kg)   SpO2 100%   BMI 22.87 kg/m²        Alert and oriented with normal mood and affect. he is well developed and well nourished . Lungs are clear without wheezing. Heart rate is regular without murmurs or gallops. There is no lower extremity edema. Incisions on left chest are healing without erythema or discharge. Assessment and Plan:        ICD-10-CM ICD-9-CM    1. Needs flu shot  Z23 V04.81 INFLUENZA, FLUARIX, FLULAVAL, FLUZONE (AGE 6 MO+), AFLURIA(AGE 3Y+) IM, PF, 0.5 ML      2.  Primary spontaneous pneumothorax  J93.11 512.81         [facility] financial form given to apply for discount. Flu shot  Advised that the numbness may persist for awhile.   I would expect the pain to improve steadily but he should ask his cardiothoracic team.       Roxanna Potter MD

## 2022-11-07 NOTE — PROGRESS NOTES
[facility] Progress Note          Today's date: 11/7/2022    Interval History:     Patient status post left VATS for left upper lobe bleb resection, mechanical pleurodesis and mechanical pleurectomy on 10/26/2022 by Dr. Marilee Sutton. His chest tube was removed and he was discharged on 10/28/2022. He is progressing well at home. His pain is well controlled with acetaminophen. He is tolerating a diet and moving his bowels. He does report a fever of 103 with night sweats several days ago after being with a sick contact. He reported a scant cough. All symptoms have resolved. He is afebrile and not in distress. PA lateral from today chest x-ray was reviewed and found to be without any obvious pneumothorax. Assessment:     Satisfactory progress after left VATS for left upper lobe lobectomy, pleurodesis and pleurectomy    Plan:     1. Anticipatory guidance provided. Pain management discussed. 2.  Encouraged use of incentive spirometry with increasing activity level. 3.  Patient is agreeable to return to work next week with limited duty insofar as no heavy lifting  4. Bandage supplies if needed were provided. Return to work note provided. 5.  No regular scheduled visits with our service required. Subjective:     Chief Complaint: None    Status post left VATS    Objective:     Admission Weight: Last Weight   Weight: 73.5 kg (162 lb) Weight: 73.5 kg (162 lb)     No flowsheet data found.      Visit Vitals  /62 (BP 1 Location: Right arm, BP Patient Position: Sitting, BP Cuff Size: Adult)   Pulse (!) 59   Temp 98.9 °F (37.2 °C) (Temporal)   Ht 5' 10\" (1.778 m)   Wt 73.5 kg (162 lb)   SpO2 97%   BMI 23.24 kg/m²       BP Readings from Last 3 Encounters:   11/07/22 112/62   11/07/22 126/73   10/28/22 136/65     Wt Readings from Last 3 Encounters:   11/07/22 73.5 kg (162 lb)   11/07/22 74.4 kg (164 lb)   10/28/22 71.9 kg (158 lb 8 oz)       Physical Exam:  General: Well-appearing. Nontoxic and in no apparent distress. Neck: No JVD or tracheal deviation. Lungs: Clear to auscultation without wheezes, rales or rhonchi. Chest: Incisions healing well without drainage. No significant point tenderness. No bony instability  Heart: Regular rate and rhythm without murmur or rub. PMI not displaced. Abdomen: Not distended. Soft and nontender. Ann Chandler PA-C    PLEASE NOTE:  This document has been produced using voice recognition software. Unrecognized errors in transcription may be present. NOTE TO PATIENT:  The purpose of this note is to communicate optimally with the other providers involved in your care. It is written using standard medical terminology. If you have questions regarding details of the note please call my office at [phone] and make an appointment to discuss your concerns.

## 2022-11-07 NOTE — PATIENT INSTRUCTIONS
Continue to increase activity as tolerated. Acetaminophen as needed. May use ibuprofen 600 mg three times per day with food for pain. OK to return to work next week with limited duty, no heavy lifting for 3-5 weeks. Keep incisions clean and dry.

## 2025-07-04 ENCOUNTER — HOSPITAL ENCOUNTER (OUTPATIENT)
Dept: HOSPITAL 53 - M EMPSKH | Age: 30
End: 2025-07-04
Attending: FAMILY MEDICINE

## 2025-07-04 DIAGNOSIS — Z11.52: Primary | ICD-10-CM

## (undated) DEVICE — RELOAD STPL H1.8-3.8MM REG THCK TISS G 6 ROW GRIPPING SURF

## (undated) DEVICE — Device

## (undated) DEVICE — 450 ML BOTTLE OF 0.05% CHLORHEXIDINE GLUCONATE IN 99.95% STERILE WATER FOR IRRIGATION, USP AND APPLICATOR.: Brand: IRRISEPT ANTIMICROBIAL WOUND LAVAGE

## (undated) DEVICE — SLEEVE COMPR STD 12 IN FOR 165IN CALF COMFORT VENODYNE SYS

## (undated) DEVICE — REM POLYHESIVE ADULT PATIENT RETURN ELECTRODE: Brand: VALLEYLAB

## (undated) DEVICE — WOUND RETRACTOR AND PROTECTOR: Brand: ALEXIS WOUND PROTECTOR-RETRACTOR

## (undated) DEVICE — VISUALIZATION SYSTEM: Brand: CLEARIFY

## (undated) DEVICE — SUT SLK 2-0SH 30IN BLK --

## (undated) DEVICE — YANKAUER,TAPERED BULBOUS TIP,W/O VENT: Brand: MEDLINE

## (undated) DEVICE — DRAPE TWL SURG 16X26IN BLU ORB04] ALLCARE INC]

## (undated) DEVICE — SUT SLK 0 30IN SH BLK --

## (undated) DEVICE — Y BARBED CONNECTOR POLYPROPYLENE, LARGE: Brand: ARGYLE

## (undated) DEVICE — DRAPE TOWEL: Brand: CONVERTORS

## (undated) DEVICE — YANKAUER,SMOOTH HANDLE,HIGH CAPACITY: Brand: MEDLINE INDUSTRIES, INC.

## (undated) DEVICE — PREP SKN CHLRAPRP APL 26ML STR --

## (undated) DEVICE — TAPE,CLOTH/SILK,CURAD,3"X10YD,LF,40/CS: Brand: CURAD

## (undated) DEVICE — GOWN,NON-REINFORCED,3XL: Brand: MEDLINE

## (undated) DEVICE — DRAPE,REIN 53X77,STERILE: Brand: MEDLINE

## (undated) DEVICE — SST BUR, ROUND OTOLOGY, 18 FLUTES X 2MM DIA.: Brand: MICROAIRE®

## (undated) DEVICE — SUTURE VCRL SZ 3-0 L27IN ABSRB UD L26MM SH 1/2 CIR J416H

## (undated) DEVICE — BLADE ELECTRODE: Brand: EDGE

## (undated) DEVICE — LAPAROSCOPIC TROCAR SLEEVE/SINGLE USE: Brand: KII® OPTICAL ACCESS SYSTEM

## (undated) DEVICE — AMD ANTIMICROBIAL NON-WOVEN SPONGES,6 PLY, 0.2% POLYHEXAMETHYLENE BIGUANIDE HCI (PHMB): Brand: EXCILON

## (undated) DEVICE — CATHETER THORACENTESIS STR 28 FRX23 IN 6 EYELET TAPR TIP LF

## (undated) DEVICE — TROCAR: Brand: KII® OPTICAL ACCESS SYSTEM

## (undated) DEVICE — STRIP,CLOSURE,WOUND,MEDI-STRIP,1/2X4: Brand: MEDLINE

## (undated) DEVICE — TUBING, SUCTION, 9/32" X 20', STRAIGHT: Brand: MEDLINE INDUSTRIES, INC.

## (undated) DEVICE — NDL HYPO RW/BVL 22GX1.5IN --

## (undated) DEVICE — SEALER LAP L37CM MARYLAND JAW OPN NANO COAT MULTIFUNCTIONAL

## (undated) DEVICE — DRAPE,UTILITY,TAPE,15X26,STERILE: Brand: MEDLINE

## (undated) DEVICE — SHEET, T, LAPAROTOMY, STERILE: Brand: MEDLINE

## (undated) DEVICE — SUT SLK 0 30IN FSL BLK --

## (undated) DEVICE — GLOVE SURG SZ 75 L114IN FNGR THK98MIL CUF THK75MIL CRM

## (undated) DEVICE — THIS PRODUCT IS SINGLE USE AND INTENDED TO BE USED FOR BLUNT DISSECTION OF TISSUE.: Brand: ASPEN® ENDOSCOPIC KITTNER, SINGLE TIP

## (undated) DEVICE — 3M™ MEDIPORE™ H SOFT CLOTH SURGICAL TAPE 2864, 4 INCH X 10 YARD (10CM X 9,14M), 12 ROLLS/CASE: Brand: 3M™ MEDIPORE™

## (undated) DEVICE — SHEARS LAP L45CM DIA5MM ULTRASONIC CRV TIP ADV HEMSTAS HARM

## (undated) DEVICE — TRAY,URINE METER,100% SILICONE,16FR10ML: Brand: MEDLINE

## (undated) DEVICE — SUTURE MCRYL SZ 4 0 L18IN ABSRB VLT PS 1 L24MM 3 8 CIR REV Y682H

## (undated) DEVICE — GOWN,SIRUS,POLYRNF,SETINSLV,XL,20/CS: Brand: MEDLINE

## (undated) DEVICE — SLEEVE RMFG Z-THREAD KII 3/BX -- LAWSON OEM ITEM 280412

## (undated) DEVICE — NEEDLE,HYPODERM,SAFETY, 25GX1.5": Brand: MEDLINE

## (undated) DEVICE — SUTURE VCRL SZ 0 L36IN ABSRB UD L36MM CT-1 1/2 CIR J946H

## (undated) DEVICE — SYR 10ML LUER LOK 1/5ML GRAD --

## (undated) DEVICE — SUTURE VCRL SZ 4-0 L27IN ABSRB UD L19MM PS-2 3/8 CIR PRIM J426H

## (undated) DEVICE — PAD,NON-ADHERENT,3X8,STERILE,LF,1/PK: Brand: MEDLINE

## (undated) DEVICE — RADIFOCUS OPTITORQUE ANGIOGRAPHIC CATHETER: Brand: OPTITORQUE

## (undated) DEVICE — STAPLER INT L34CM 60MM LNG ENDOSCP ARTC PWR + ECHELON FLX

## (undated) DEVICE — TROCAR: Brand: KII® SLEEVE

## (undated) DEVICE — CRADLE POS 3X5X24IN RASPBERRY ARM PRONE FOAM DISP

## (undated) DEVICE — PAD NON-ADHERENT 3X4 STRL LF --

## (undated) DEVICE — KIT,ANTI FOG,W/SPONGE & FLUID,SOFT PACK: Brand: MEDLINE

## (undated) DEVICE — COVADERM: Brand: DEROYAL

## (undated) DEVICE — INTENDED FOR TISSUE SEPARATION, AND OTHER PROCEDURES THAT REQUIRE A SHARP SURGICAL BLADE TO PUNCTURE OR CUT.: Brand: BARD-PARKER ® STAINLESS STEEL BLADES

## (undated) DEVICE — GLOVE SURG SZ 8 L11.77IN FNGR THK9.8MIL STRW LTX POLYMER

## (undated) DEVICE — DRESSING,GAUZE,XEROFORM,CURAD,1"X8",ST: Brand: CURAD